# Patient Record
Sex: MALE | Employment: FULL TIME
[De-identification: names, ages, dates, MRNs, and addresses within clinical notes are randomized per-mention and may not be internally consistent; named-entity substitution may affect disease eponyms.]

---

## 2017-04-10 ENCOUNTER — RECORDS - HEALTHEAST (OUTPATIENT)
Dept: ADMINISTRATIVE | Facility: OTHER | Age: 22
End: 2017-04-10

## 2017-12-28 ENCOUNTER — RECORDS - HEALTHEAST (OUTPATIENT)
Dept: ADMINISTRATIVE | Facility: OTHER | Age: 22
End: 2017-12-28

## 2018-04-26 ENCOUNTER — OFFICE VISIT - HEALTHEAST (OUTPATIENT)
Dept: FAMILY MEDICINE | Facility: CLINIC | Age: 23
End: 2018-04-26

## 2018-04-26 DIAGNOSIS — F32.1 MODERATE MAJOR DEPRESSION (H): ICD-10-CM

## 2018-04-26 DIAGNOSIS — Z00.00 ENCOUNTER FOR ROUTINE HISTORY AND PHYSICAL EXAM FOR MALE: ICD-10-CM

## 2018-04-26 DIAGNOSIS — R41.840 ATTENTION DEFICIT: ICD-10-CM

## 2018-04-26 DIAGNOSIS — F41.1 GAD (GENERALIZED ANXIETY DISORDER): ICD-10-CM

## 2018-04-26 DIAGNOSIS — Z13.228 SCREENING FOR METABOLIC DISORDER: ICD-10-CM

## 2018-04-26 DIAGNOSIS — I10 HTN, GOAL BELOW 130/80: ICD-10-CM

## 2018-04-26 LAB
ANION GAP SERPL CALCULATED.3IONS-SCNC: 12 MMOL/L (ref 5–18)
BUN SERPL-MCNC: 14 MG/DL (ref 8–22)
CALCIUM SERPL-MCNC: 10 MG/DL (ref 8.5–10.5)
CHLORIDE BLD-SCNC: 108 MMOL/L (ref 98–107)
CHOLEST SERPL-MCNC: 191 MG/DL
CO2 SERPL-SCNC: 22 MMOL/L (ref 22–31)
CREAT SERPL-MCNC: 0.87 MG/DL (ref 0.7–1.3)
FASTING STATUS PATIENT QL REPORTED: NO
GFR SERPL CREATININE-BSD FRML MDRD: >60 ML/MIN/1.73M2
GLUCOSE BLD-MCNC: 80 MG/DL (ref 70–125)
HBA1C MFR BLD: 4.7 % (ref 3.5–6.1)
HDLC SERPL-MCNC: 46 MG/DL
LDLC SERPL CALC-MCNC: 120 MG/DL
POTASSIUM BLD-SCNC: 4 MMOL/L (ref 3.5–5)
SODIUM SERPL-SCNC: 142 MMOL/L (ref 136–145)
TRIGL SERPL-MCNC: 126 MG/DL
TSH SERPL DL<=0.005 MIU/L-ACNC: 1.02 UIU/ML (ref 0.3–5)

## 2018-04-26 ASSESSMENT — MIFFLIN-ST. JEOR: SCORE: 2249.41

## 2021-03-02 ENCOUNTER — TELEPHONE (OUTPATIENT)
Dept: BEHAVIORAL HEALTH | Facility: CLINIC | Age: 26
End: 2021-03-02

## 2021-03-02 ENCOUNTER — HOSPITAL ENCOUNTER (INPATIENT)
Facility: CLINIC | Age: 26
LOS: 3 days | Discharge: PSYCHIATRIC OR SUBSTANCE USE FACILITY | DRG: 897 | End: 2021-03-05
Attending: EMERGENCY MEDICINE | Admitting: PSYCHIATRY & NEUROLOGY
Payer: COMMERCIAL

## 2021-03-02 DIAGNOSIS — Z20.822 COVID-19 RULED OUT BY LABORATORY TESTING: ICD-10-CM

## 2021-03-02 DIAGNOSIS — F13.10 BENZODIAZEPINE ABUSE (H): ICD-10-CM

## 2021-03-02 DIAGNOSIS — F10.20 UNCOMPLICATED ALCOHOL DEPENDENCE (H): ICD-10-CM

## 2021-03-02 LAB
ALBUMIN SERPL-MCNC: 4.6 G/DL (ref 3.4–5)
ALCOHOL BREATH TEST: 0 (ref 0–0.01)
ALP SERPL-CCNC: 77 U/L (ref 40–150)
ALT SERPL W P-5'-P-CCNC: 116 U/L (ref 0–70)
AMPHETAMINES UR QL SCN: NEGATIVE
ANION GAP SERPL CALCULATED.3IONS-SCNC: 6 MMOL/L (ref 3–14)
AST SERPL W P-5'-P-CCNC: 47 U/L (ref 0–45)
BARBITURATES UR QL: NEGATIVE
BASOPHILS # BLD AUTO: 0.1 10E9/L (ref 0–0.2)
BASOPHILS NFR BLD AUTO: 0.7 %
BENZODIAZ UR QL: NEGATIVE
BILIRUB SERPL-MCNC: 0.6 MG/DL (ref 0.2–1.3)
BUN SERPL-MCNC: 13 MG/DL (ref 7–30)
CALCIUM SERPL-MCNC: 10.1 MG/DL (ref 8.5–10.1)
CANNABINOIDS UR QL SCN: POSITIVE
CHLORIDE SERPL-SCNC: 108 MMOL/L (ref 94–109)
CO2 SERPL-SCNC: 25 MMOL/L (ref 20–32)
COCAINE UR QL: NEGATIVE
CREAT SERPL-MCNC: 0.87 MG/DL (ref 0.66–1.25)
DIFFERENTIAL METHOD BLD: NORMAL
EOSINOPHIL # BLD AUTO: 0.1 10E9/L (ref 0–0.7)
EOSINOPHIL NFR BLD AUTO: 1.4 %
ERYTHROCYTE [DISTWIDTH] IN BLOOD BY AUTOMATED COUNT: 11.7 % (ref 10–15)
ETHANOL UR QL SCN: NEGATIVE
GFR SERPL CREATININE-BSD FRML MDRD: >90 ML/MIN/{1.73_M2}
GGT SERPL-CCNC: 178 U/L (ref 0–75)
GLUCOSE SERPL-MCNC: 108 MG/DL (ref 70–99)
HCT VFR BLD AUTO: 50.5 % (ref 40–53)
HGB BLD-MCNC: 17.5 G/DL (ref 13.3–17.7)
IMM GRANULOCYTES # BLD: 0.1 10E9/L (ref 0–0.4)
IMM GRANULOCYTES NFR BLD: 0.8 %
LABORATORY COMMENT REPORT: NORMAL
LYMPHOCYTES # BLD AUTO: 2.1 10E9/L (ref 0.8–5.3)
LYMPHOCYTES NFR BLD AUTO: 24.9 %
MCH RBC QN AUTO: 29.7 PG (ref 26.5–33)
MCHC RBC AUTO-ENTMCNC: 34.7 G/DL (ref 31.5–36.5)
MCV RBC AUTO: 86 FL (ref 78–100)
MONOCYTES # BLD AUTO: 0.6 10E9/L (ref 0–1.3)
MONOCYTES NFR BLD AUTO: 7 %
NEUTROPHILS # BLD AUTO: 5.5 10E9/L (ref 1.6–8.3)
NEUTROPHILS NFR BLD AUTO: 65.2 %
NRBC # BLD AUTO: 0 10*3/UL
NRBC BLD AUTO-RTO: 0 /100
OPIATES UR QL SCN: NEGATIVE
PLATELET # BLD AUTO: 282 10E9/L (ref 150–450)
POTASSIUM SERPL-SCNC: 4.5 MMOL/L (ref 3.4–5.3)
PROT SERPL-MCNC: 8.1 G/DL (ref 6.8–8.8)
RBC # BLD AUTO: 5.89 10E12/L (ref 4.4–5.9)
SARS-COV-2 RNA RESP QL NAA+PROBE: NEGATIVE
SODIUM SERPL-SCNC: 139 MMOL/L (ref 133–144)
SPECIMEN SOURCE: NORMAL
WBC # BLD AUTO: 8.5 10E9/L (ref 4–11)

## 2021-03-02 PROCEDURE — 99207 PR CDG-HISTORY COMP: MEETS EXP. PROB FOCUSED- DOWN CODED LACK OF PFSH: CPT | Performed by: PSYCHIATRY & NEUROLOGY

## 2021-03-02 PROCEDURE — 128N000004 HC R&B CD ADULT

## 2021-03-02 PROCEDURE — HZ2ZZZZ DETOXIFICATION SERVICES FOR SUBSTANCE ABUSE TREATMENT: ICD-10-PCS | Performed by: EMERGENCY MEDICINE

## 2021-03-02 PROCEDURE — 82977 ASSAY OF GGT: CPT | Performed by: EMERGENCY MEDICINE

## 2021-03-02 PROCEDURE — 82075 ASSAY OF BREATH ETHANOL: CPT | Performed by: EMERGENCY MEDICINE

## 2021-03-02 PROCEDURE — 99284 EMERGENCY DEPT VISIT MOD MDM: CPT | Performed by: EMERGENCY MEDICINE

## 2021-03-02 PROCEDURE — 80307 DRUG TEST PRSMV CHEM ANLYZR: CPT | Performed by: EMERGENCY MEDICINE

## 2021-03-02 PROCEDURE — C9803 HOPD COVID-19 SPEC COLLECT: HCPCS | Performed by: EMERGENCY MEDICINE

## 2021-03-02 PROCEDURE — 99285 EMERGENCY DEPT VISIT HI MDM: CPT | Mod: 25 | Performed by: EMERGENCY MEDICINE

## 2021-03-02 PROCEDURE — 80320 DRUG SCREEN QUANTALCOHOLS: CPT | Performed by: EMERGENCY MEDICINE

## 2021-03-02 PROCEDURE — 99207 PR CONSULT E&M CHANGED TO INITIAL LEVEL: CPT | Performed by: PHYSICIAN ASSISTANT

## 2021-03-02 PROCEDURE — 80053 COMPREHEN METABOLIC PANEL: CPT | Performed by: EMERGENCY MEDICINE

## 2021-03-02 PROCEDURE — 87635 SARS-COV-2 COVID-19 AMP PRB: CPT | Performed by: EMERGENCY MEDICINE

## 2021-03-02 PROCEDURE — 85025 COMPLETE CBC W/AUTO DIFF WBC: CPT | Performed by: EMERGENCY MEDICINE

## 2021-03-02 PROCEDURE — 250N000013 HC RX MED GY IP 250 OP 250 PS 637: Performed by: PSYCHIATRY & NEUROLOGY

## 2021-03-02 PROCEDURE — 99222 1ST HOSP IP/OBS MODERATE 55: CPT | Performed by: PHYSICIAN ASSISTANT

## 2021-03-02 PROCEDURE — 99221 1ST HOSP IP/OBS SF/LOW 40: CPT | Mod: AI | Performed by: PSYCHIATRY & NEUROLOGY

## 2021-03-02 RX ORDER — LISINOPRIL 30 MG/1
30 TABLET ORAL DAILY
Status: ON HOLD | COMMUNITY
End: 2021-03-04

## 2021-03-02 RX ORDER — FAMOTIDINE 20 MG
1000 TABLET ORAL WEEKLY
Status: ON HOLD | COMMUNITY
End: 2021-03-02

## 2021-03-02 RX ORDER — METFORMIN HCL 500 MG
500 TABLET, EXTENDED RELEASE 24 HR ORAL
Status: DISCONTINUED | OUTPATIENT
Start: 2021-03-03 | End: 2021-03-05 | Stop reason: HOSPADM

## 2021-03-02 RX ORDER — ATORVASTATIN CALCIUM 10 MG/1
10 TABLET, FILM COATED ORAL DAILY
Status: DISCONTINUED | OUTPATIENT
Start: 2021-03-02 | End: 2021-03-05 | Stop reason: HOSPADM

## 2021-03-02 RX ORDER — ERGOCALCIFEROL 1.25 MG/1
50000 CAPSULE, LIQUID FILLED ORAL WEEKLY
Status: ON HOLD | COMMUNITY
End: 2021-03-04

## 2021-03-02 RX ORDER — ATORVASTATIN CALCIUM 10 MG/1
10 TABLET, FILM COATED ORAL DAILY
Status: DISCONTINUED | OUTPATIENT
Start: 2021-03-02 | End: 2021-03-02

## 2021-03-02 RX ORDER — DIAZEPAM 5 MG
5-20 TABLET ORAL EVERY 30 MIN PRN
Status: DISCONTINUED | OUTPATIENT
Start: 2021-03-02 | End: 2021-03-04

## 2021-03-02 RX ORDER — LANOLIN ALCOHOL/MO/W.PET/CERES
100 CREAM (GRAM) TOPICAL DAILY
Status: DISCONTINUED | OUTPATIENT
Start: 2021-03-02 | End: 2021-03-02

## 2021-03-02 RX ORDER — ERGOCALCIFEROL 1.25 MG/1
50000 CAPSULE, LIQUID FILLED ORAL WEEKLY
Status: DISCONTINUED | OUTPATIENT
Start: 2021-03-05 | End: 2021-03-05 | Stop reason: HOSPADM

## 2021-03-02 RX ORDER — FOLIC ACID 1 MG/1
1 TABLET ORAL DAILY
Status: DISCONTINUED | OUTPATIENT
Start: 2021-03-02 | End: 2021-03-02

## 2021-03-02 RX ORDER — FOLIC ACID 1 MG/1
1 TABLET ORAL DAILY
Status: DISCONTINUED | OUTPATIENT
Start: 2021-03-02 | End: 2021-03-05 | Stop reason: HOSPADM

## 2021-03-02 RX ORDER — FLUOXETINE 40 MG/1
40 CAPSULE ORAL DAILY
Status: ON HOLD | COMMUNITY
End: 2021-03-04

## 2021-03-02 RX ORDER — ATORVASTATIN CALCIUM 10 MG/1
10 TABLET, FILM COATED ORAL DAILY
Status: ON HOLD | COMMUNITY
End: 2021-03-04

## 2021-03-02 RX ORDER — MULTIPLE VITAMINS W/ MINERALS TAB 9MG-400MCG
1 TAB ORAL DAILY
Status: DISCONTINUED | OUTPATIENT
Start: 2021-03-02 | End: 2021-03-05 | Stop reason: HOSPADM

## 2021-03-02 RX ORDER — PHENOBARBITAL 32.4 MG/1
32.4 TABLET ORAL 3 TIMES DAILY
Status: COMPLETED | OUTPATIENT
Start: 2021-03-02 | End: 2021-03-04

## 2021-03-02 RX ORDER — MULTIPLE VITAMINS W/ MINERALS TAB 9MG-400MCG
1 TAB ORAL DAILY
Status: DISCONTINUED | OUTPATIENT
Start: 2021-03-02 | End: 2021-03-02

## 2021-03-02 RX ORDER — LANOLIN ALCOHOL/MO/W.PET/CERES
100 CREAM (GRAM) TOPICAL DAILY
Status: DISCONTINUED | OUTPATIENT
Start: 2021-03-02 | End: 2021-03-05 | Stop reason: HOSPADM

## 2021-03-02 RX ORDER — VITAMIN B COMPLEX
1000 TABLET ORAL WEEKLY
Status: DISCONTINUED | OUTPATIENT
Start: 2021-03-02 | End: 2021-03-02

## 2021-03-02 RX ORDER — METFORMIN HCL 500 MG
500 TABLET, EXTENDED RELEASE 24 HR ORAL DAILY
Status: ON HOLD | COMMUNITY
End: 2021-03-04

## 2021-03-02 RX ORDER — PHENOBARBITAL 32.4 MG/1
32.4 TABLET ORAL 3 TIMES DAILY
Status: DISCONTINUED | OUTPATIENT
Start: 2021-03-02 | End: 2021-03-02

## 2021-03-02 RX ADMIN — ATORVASTATIN CALCIUM 10 MG: 10 TABLET, FILM COATED ORAL at 16:50

## 2021-03-02 RX ADMIN — DIAZEPAM 5 MG: 5 TABLET ORAL at 20:12

## 2021-03-02 RX ADMIN — FOLIC ACID 1 MG: 1 TABLET ORAL at 16:49

## 2021-03-02 RX ADMIN — THIAMINE HCL TAB 100 MG 100 MG: 100 TAB at 16:49

## 2021-03-02 RX ADMIN — MULTIPLE VITAMINS W/ MINERALS TAB 1 TABLET: TAB at 16:49

## 2021-03-02 RX ADMIN — FLUOXETINE 40 MG: 20 CAPSULE ORAL at 16:47

## 2021-03-02 RX ADMIN — PHENOBARBITAL 32.4 MG: 32.4 TABLET ORAL at 20:12

## 2021-03-02 RX ADMIN — LISINOPRIL 30 MG: 20 TABLET ORAL at 16:47

## 2021-03-02 RX ADMIN — DIAZEPAM 5 MG: 5 TABLET ORAL at 16:50

## 2021-03-02 RX ADMIN — PHENOBARBITAL 32.4 MG: 32.4 TABLET ORAL at 16:48

## 2021-03-02 ASSESSMENT — ACTIVITIES OF DAILY LIVING (ADL)
ORAL_HYGIENE: INDEPENDENT
LAUNDRY: WITH SUPERVISION
HYGIENE/GROOMING: INDEPENDENT
DRESS: INDEPENDENT;STREET CLOTHES;SCRUBS (BEHAVIORAL HEALTH)

## 2021-03-02 ASSESSMENT — ENCOUNTER SYMPTOMS
FEVER: 0
HEADACHES: 0
SHORTNESS OF BREATH: 0
NAUSEA: 0
NECK STIFFNESS: 0
EYE REDNESS: 0
CONFUSION: 0
ABDOMINAL PAIN: 0
COLOR CHANGE: 0
VOMITING: 0
DIFFICULTY URINATING: 0
ARTHRALGIAS: 0
MYALGIAS: 0

## 2021-03-02 ASSESSMENT — MIFFLIN-ST. JEOR
SCORE: 2458.64
SCORE: 2457.73

## 2021-03-02 NOTE — TELEPHONE ENCOUNTER
Pt presents in fv ed seeking detox  B: pt abusing 4-6mg Xanax daily past 3 years, states he cannot stop or has withdrawals of shakes, anxiety and irritability, foggy thinking. Also using up to 10 drinks daily of etoh. Last use of both was last night. Pt deies any hx seizures or dt's. Denies any MHsymptoms.   A: etoh and benzo detox.  Cooperative, vol.  R: [facility]/3A Cd  Patient cleared and ready for behavioral bed placement: Yes

## 2021-03-02 NOTE — PHARMACY-ADMISSION MEDICATION HISTORY
Admission Medication History Completed by Pharmacy    See [facility] Admission Navigator for allergy information, preferred outpatient pharmacy, prior to admission medications and immunization status.     Medication History Sources:     Patient    Pharmacy fill history via [employer]    Changes made to PTA medication list (reason):    Added: None    Deleted: None    Changed:   o Metformin to extended release (per fill history)  o Vitamin D (cholecalciferol) 1000 units po once weekly --> ergocalciferol 50,000 units po once weekly (per fill history, pt)    Additional Information:    Pt takes vitamin D weekly on Fridays.    Prior to Admission medications    Medication Sig Last Dose Taking? Auth Provider   atorvastatin (LIPITOR) 10 MG tablet Take 10 mg by mouth daily 3/1/2021 at Unknown time Yes Reported, Patient   FLUoxetine (PROZAC) 40 MG capsule Take 40 mg by mouth daily 3/1/2021 at Unknown time Yes Reported, Patient   lisinopril (ZESTRIL) 30 MG tablet Take 30 mg by mouth daily 3/1/2021 at Unknown time Yes Reported, Patient   metFORMIN (GLUCOPHAGE-XR) 500 MG 24 hr tablet Take 500 mg by mouth daily 3/1/2021 at Unknown time Yes Unknown, Entered By History   vitamin D2 (ERGOCALCIFEROL) 51669 units (1250 mcg) capsule Take 50,000 Units by mouth once a week 2/26/2021 Yes Unknown, Entered By History       Date completed: 03/02/21    Medication history completed by: Brenda Bueno [facility]
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
## 2021-03-02 NOTE — PROGRESS NOTES
03/02/21 1430   Patient Belongings   Did you bring any home meds/supplements to the hospital?  No   Patient Belongings other (see comments)   Belongings Search Yes   Clothing Search Yes   Second Staff Johnny LAWSON, Ras BUSTAMANTE,     Pants, coat, shoes, small bag in storage  Cell in med room  Vape, ear buds, keys in sharps  2 MC, medical card, passport in security    A               Admission:  I am responsible for any personal items that are not sent to the safe or pharmacy.  [facility] is not responsible for loss, theft or damage of any property in my possession.    Signature:  _________________________________ Date: _______  Time: _____                                              Staff Signature:  ____________________________ Date: ________  Time: _____      2nd Staff person, if patient is unable/unwilling to sign:    Signature: ________________________________ Date: ________  Time: _____     Discharge:  [facility] has returned all of my personal belongings:    Signature: _________________________________ Date: ________  Time: _____                                          Staff Signature:  ____________________________ Date: ________  Time: _____

## 2021-03-02 NOTE — PLAN OF CARE
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  He averages 4-6 mg of Xanax per day and approximately 7-10 drinks per day, both with most recent use at 01:00 on 3/2/2021.  He struggles with morbid obesity, and has been prescribed oral glucophage as an appetite suppressant.  He reports no other acute medical concerns.  He is interested in treatment post-discharge, and reports a supportive network of social resources at his disposal.  He has never been to \"detox\" nor treatment before.  He has no children.  Will monitor as prudent.  "

## 2021-03-02 NOTE — ED NOTES
Patient here to detox from ETOH, cocaine, xanax and THC. Patient states he binge drinks for years. Patient has a drink or 2 per day during the week, and xanax 4-6 mg per day. Last xanax use last night. ETOH last use last night. Patient has not been to treatment or detox in the past. Denies hx of seizure or DTs.

## 2021-03-02 NOTE — CONSULTS
[facility]  Consult Note - Hospitalist Service     Date of Admission:  3/2/2021  Consult Requested by: Dr. Peguero   Reason for Consult: Co management     Assessment & Plan   Portillo Queen is a 25 year old male admitted on 3/2/2021. He has a history of depression, SHERIN, ADHD, polysubstance abuse who is admitted to  for detox from alcohol and xanax.     Polysubstance abuse   Reports use of ETOH, cocaine, xanax and THC. Reports he drinks about ten drinks at least two days per week. Uses Xanax 4-6 mg per day. Denies hx of withdrawal seizures or DTs. Utox positive for cannabinoids, negative for benzodiazepine, ETOH, cocaine.   - Management per psychiatry     ADHD   Depression   Anxiety   - Management per primary team     [facility] hypertension  PTA on lisinopril 30 mg daily. BP slightly elevated in the setting of withdrawal.   - Continue PTA lisinopril     Transaminitis   , AST 47. Tbili and alk phos wnl. GGT elevated at 178. Elevated in liver enzymes likely 2/2 ETOH use however not consistent with typical 2:1 ratio seen in alcohol hepatitis. Denies abdominal pain, nausea, vomiting.   - Repeat CMP in 48 hours     Obesity   BMI 36   PTA on metformin 500 mg daily. Cr stable.   - Continue PTA metformin   - Continue PTA Lipitor        The patient's care was discussed with the Bedside Nurse and Patient.    DIANNA Marcelo  [facility]  Contact information available via [facility] Paging/Directory    ______________________________________________________________________    Chief Complaint   Substance abuse     History is obtained from the patient and review of medical record.     History of Present Illness   Portillo Queen is a 25 year old male who has a history of depression, SHERIN, ADHD, obesity, polysubstance abuse who is admitted to  for detox from alcohol and xanax. Patient reports using xanax daily about 4-6 mg. He also  "uses opioids sporadically. He \"binge\" drinking at last two nights per week when he typically consumes at least ten alcohol drinks. Patient also reports using TCH daily. Denies withdrawal seizures or DTs. Current reports withdrawal symptoms of anxiety. Patient reports physically he has been feeling well. He has been working with his PCP on weight management and is currently taking Metformin for cravings. Denies cough, SOB, chest pain, nausea, vomiting, abdominal pain, bowel or bladder issues, numbness, tingling, rash. Denies IV drug use.       Review of Systems   The 10 point Review of Systems is negative other than noted in the HPI.    Past Medical History    I have reviewed this patient's medical history and updated it with pertinent information if needed.   Past Medical History:   Diagnosis Date     High cholesterol      Hypertension        Past Surgical History   I have reviewed this patient's surgical history and updated it with pertinent information if needed.  History reviewed. No pertinent surgical history.    Social History   I have reviewed this patient's social history and updated it with pertinent information if needed.  Social History     Tobacco Use     Smoking status: Never Smoker     Smokeless tobacco: Never Used   Substance Use Topics     Alcohol use: Yes     Comment: 5 shots gin and tonic or [place].     Drug use: Yes     Types: Benzodiazepines, Cocaine, Opiates, Marijuana     Comment: Xanax 2 bars per day.  THC uses wax 5 times per day ( states it is stronger)       Family History   I have reviewed this patient's family history and updated it with pertinent information if needed.  Family History   Problem Relation Age of Onset     Diabetes Mother      Diabetes Father      Diabetes Maternal Grandfather      Diabetes Paternal Grandmother        Medications   Medications Prior to Admission   Medication Sig Dispense Refill Last Dose     atorvastatin (LIPITOR) 10 MG tablet Take 10 mg by mouth daily  "  3/1/2021 at Unknown time     FLUoxetine (PROZAC) 40 MG capsule Take 40 mg by mouth daily   3/1/2021 at Unknown time     lisinopril (ZESTRIL) 30 MG tablet Take 30 mg by mouth daily   3/1/2021 at Unknown time     metFORMIN (GLUCOPHAGE) 500 MG tablet Take 500 mg by mouth every morning Takes with meal   3/1/2021 at Unknown time     Vitamin D, Cholecalciferol, 25 MCG (1000 UT) CAPS Take 1,000 Units by mouth once a week   Past Week at Unknown time       Allergies   Allergies   Allergen Reactions     Amoxicillin Rash     Penicillins Rash     Sulfa Drugs Rash       Physical Exam   Vital Signs: Temp: 97.9  F (36.6  C) Temp src: Oral BP: (!) 140/88 Pulse: 88   Resp: 16 SpO2: 98 % O2 Device: None (Room air)    Weight: 306 lbs 0 oz  GENERAL: Alert and oriented x 3. NAD.   HEENT: Anicteric sclera. PERRL. Mucous membranes moist and without lesions.   CV: RRR. S1, S2. No murmurs appreciated.   RESPIRATORY: Effort normal on RA. Lungs CTAB with no wheezing, rales, rhonchi.   GI: Abdomen soft, obese and non distended, bowel sounds present. No tenderness, rebound, guarding.   MUSCULOSKELETAL: No joint swelling or tenderness. Moves all extremities.   NEUROLOGICAL: No focal deficits.   EXTREMITIES: No peripheral edema. Intact bilateral pedal pulses.   SKIN: No jaundice. No rashes.       Data   Results for orders placed or performed during the hospital encounter of 03/02/21 (from the past 24 hour(s))   Drug abuse screen 6 urine (tox)   Result Value Ref Range    Amphetamine Qual Urine Negative NEG^Negative    Barbiturates Qual Urine Negative NEG^Negative    Benzodiazepine Qual Urine Negative NEG^Negative    Cannabinoids Qual Urine Positive (A) NEG^Negative    Cocaine Qual Urine Negative NEG^Negative    Ethanol Qual Urine Negative NEG^Negative    Opiates Qualitative Urine Negative NEG^Negative   CBC with platelets differential   Result Value Ref Range    WBC 8.5 4.0 - 11.0 10e9/L    RBC Count 5.89 4.4 - 5.9 10e12/L    Hemoglobin 17.5 13.3  - 17.7 g/dL    Hematocrit 50.5 40.0 - 53.0 %    MCV 86 78 - 100 fl    MCH 29.7 26.5 - 33.0 pg    MCHC 34.7 31.5 - 36.5 g/dL    RDW 11.7 10.0 - 15.0 %    Platelet Count 282 150 - 450 10e9/L    Diff Method Automated Method     % Neutrophils 65.2 %    % Lymphocytes 24.9 %    % Monocytes 7.0 %    % Eosinophils 1.4 %    % Basophils 0.7 %    % Immature Granulocytes 0.8 %    Nucleated RBCs 0 0 /100    Absolute Neutrophil 5.5 1.6 - 8.3 10e9/L    Absolute Lymphocytes 2.1 0.8 - 5.3 10e9/L    Absolute Monocytes 0.6 0.0 - 1.3 10e9/L    Absolute Eosinophils 0.1 0.0 - 0.7 10e9/L    Absolute Basophils 0.1 0.0 - 0.2 10e9/L    Abs Immature Granulocytes 0.1 0 - 0.4 10e9/L    Absolute Nucleated RBC 0.0    Comprehensive metabolic panel   Result Value Ref Range    Sodium 139 133 - 144 mmol/L    Potassium 4.5 3.4 - 5.3 mmol/L    Chloride 108 94 - 109 mmol/L    Carbon Dioxide 25 20 - 32 mmol/L    Anion Gap 6 3 - 14 mmol/L    Glucose 108 (H) 70 - 99 mg/dL    Urea Nitrogen 13 7 - 30 mg/dL    Creatinine 0.87 0.66 - 1.25 mg/dL    GFR Estimate >90 >60 mL/min/[1.73_m2]    GFR Estimate If Black >90 >60 mL/min/[1.73_m2]    Calcium 10.1 8.5 - 10.1 mg/dL    Bilirubin Total 0.6 0.2 - 1.3 mg/dL    Albumin 4.6 3.4 - 5.0 g/dL    Protein Total 8.1 6.8 - 8.8 g/dL    Alkaline Phosphatase 77 40 - 150 U/L     (H) 0 - 70 U/L    AST 47 (H) 0 - 45 U/L   Asymptomatic SARS-CoV-2 COVID-19 Virus (Coronavirus) by PCR    Specimen: Nasopharyngeal   Result Value Ref Range    SARS-CoV-2 Virus Specimen Source Nasopharyngeal     SARS-CoV-2 PCR Result NEGATIVE     SARS-CoV-2 PCR Comment (Note)    GGT   Result Value Ref Range     (H) 0 - 75 U/L   Alcohol breath test POCT   Result Value Ref Range    Alcohol Breath Test 0 0.00 - 0.01
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
## 2021-03-03 PROCEDURE — 99233 SBSQ HOSP IP/OBS HIGH 50: CPT | Performed by: PSYCHIATRY & NEUROLOGY

## 2021-03-03 PROCEDURE — 250N000013 HC RX MED GY IP 250 OP 250 PS 637: Performed by: PSYCHIATRY & NEUROLOGY

## 2021-03-03 PROCEDURE — 128N000004 HC R&B CD ADULT

## 2021-03-03 PROCEDURE — 99207 PR CDG-MDM COMPONENT: MEETS HIGH - UP CODED: CPT | Performed by: PSYCHIATRY & NEUROLOGY

## 2021-03-03 RX ORDER — GABAPENTIN 300 MG/1
300 CAPSULE ORAL 3 TIMES DAILY PRN
Status: DISCONTINUED | OUTPATIENT
Start: 2021-03-03 | End: 2021-03-05 | Stop reason: HOSPADM

## 2021-03-03 RX ORDER — TRAZODONE HYDROCHLORIDE 50 MG/1
50 TABLET, FILM COATED ORAL
Status: DISCONTINUED | OUTPATIENT
Start: 2021-03-03 | End: 2021-03-05 | Stop reason: HOSPADM

## 2021-03-03 RX ADMIN — FOLIC ACID 1 MG: 1 TABLET ORAL at 08:28

## 2021-03-03 RX ADMIN — DIAZEPAM 10 MG: 5 TABLET ORAL at 04:29

## 2021-03-03 RX ADMIN — MULTIPLE VITAMINS W/ MINERALS TAB 1 TABLET: TAB at 08:28

## 2021-03-03 RX ADMIN — PHENOBARBITAL 32.4 MG: 32.4 TABLET ORAL at 13:55

## 2021-03-03 RX ADMIN — PHENOBARBITAL 32.4 MG: 32.4 TABLET ORAL at 08:29

## 2021-03-03 RX ADMIN — GABAPENTIN 300 MG: 300 CAPSULE ORAL at 13:55

## 2021-03-03 RX ADMIN — PHENOBARBITAL 32.4 MG: 32.4 TABLET ORAL at 20:22

## 2021-03-03 RX ADMIN — THIAMINE HCL TAB 100 MG 100 MG: 100 TAB at 08:28

## 2021-03-03 RX ADMIN — FLUOXETINE 40 MG: 20 CAPSULE ORAL at 08:28

## 2021-03-03 RX ADMIN — METFORMIN HYDROCHLORIDE 500 MG: 500 TABLET, EXTENDED RELEASE ORAL at 08:28

## 2021-03-03 RX ADMIN — DIAZEPAM 10 MG: 5 TABLET ORAL at 08:30

## 2021-03-03 RX ADMIN — LISINOPRIL 30 MG: 20 TABLET ORAL at 08:28

## 2021-03-03 RX ADMIN — ATORVASTATIN CALCIUM 10 MG: 10 TABLET, FILM COATED ORAL at 08:28

## 2021-03-03 ASSESSMENT — ACTIVITIES OF DAILY LIVING (ADL)
HYGIENE/GROOMING: INDEPENDENT
DRESS: STREET CLOTHES;INDEPENDENT
ORAL_HYGIENE: INDEPENDENT
LAUNDRY: WITH SUPERVISION

## 2021-03-03 NOTE — PLAN OF CARE
"S:  Mario has been visible in the milieu.  He participated in music group and then later on stayed in the lounge for half of the Recovery video.  He is eating and drinking normally.  He denies any thoughts of self harm, suicide or thoughts of harming others. He rates his depression at a \"7\" and his anxiety at an \"8\".  B: Pt admitted for alcohol and benzodiazepine (BZ) withdrawal and detoxification.  A:  Pt in moderate alcohol and BZ withdrawal AEB MSSA scores of 8 & 8.  R:  Administered regularly scheduled medications (see eMar) and PRN diazepam 5 mg. Continue to monitor and medicate as ordered and indicated.   "

## 2021-03-03 NOTE — PROGRESS NOTES
"[facility], [place]   Psychiatric Progress Note        Interim history   This is a 25 year old male with 1.  Alcohol use disorder, severe.   2.  Alcohol withdrawal, severe.     3.  Xanax use disorder.     4.  Xanax withdrawal, severe.   5.  Marijuana use disorder, severe.   6.  Major depressive disorder, recurrent, severe without psychosis.   7.  Posttraumatic stress disorder, chronic. .Pt seen in rounds.   The patient's care was discussed with the treatment team during the daily team meeting and/or staff's chart notes were reviewed.  Staff report patient has been visible in the milieu,  no acute eventsovernight.     Patient's mood is positive   Energy Level:MODERATE  Sleep:poor   Appetite:fine, low  motivation interest   Suicidal/homicidal ideation/plan intent.psychosis  No prior suicde attempts  No access to gun  Pt is in alcohol withdrawal still being monitered every 4 hrs for it,   Pt mssa score are monitered  Tolerating meds and has no side effects.              Medications:     Current Facility-Administered Medications   Medication     atorvastatin (LIPITOR) tablet 10 mg     diazepam (VALIUM) tablet 5-20 mg     FLUoxetine (PROzac) capsule 40 mg     folic acid (FOLVITE) tablet 1 mg     lisinopril (ZESTRIL) tablet 30 mg     metFORMIN (GLUCOPHAGE-XR) 24 hr tablet 500 mg     multivitamin w/minerals (THERA-VIT-M) tablet 1 tablet     PHENobarbital (LUMINAL) tablet 32.4 mg     thiamine (B-1) tablet 100 mg     [START ON 3/5/2021] vitamin D2 (ERGOCALCIFEROL) 92233 units (1250 mcg) capsule 50,000 Units             Allergies:     Allergies   Allergen Reactions     Amoxicillin Rash     Penicillins Rash     Sulfa Drugs Rash            Psychiatric Examination:   Blood pressure (!) 131/91, pulse 85, temperature 97.4  F (36.3  C), temperature source Temporal, resp. rate 16, height 1.905 m (6' 3\"), weight 138.8 kg (306 lb), SpO2 96 %.  Weight is 306 lbs 0 oz  Body mass index is 38.25 " kg/m .    Appearance:  awake, alert and adequately groomed  Attitude:  cooperative  Eye Contact:  good  Mood:  better  Affect:  appropriate and in normal range and mood congruent  Speech:  clear, coherent rate /rhythm are good  Psychomotor Behavior:  no evidence of tardive dyskinesia, dystonia, or tics and intact station, gait and muscle tone  Throught Process:  logical  Associations:  no loose associations  Thought Content:  no evidence of suicidal ideation or homicidal ideation, no evidence of psychotic thought, no auditory hallucinations present and no visual hallucinations present  Insight:  fair  Judgement:  intact  Oriented to:  time, person, and place  Attention Span and Concentration:  intact  Recent and Remote Memory:  intact  Language fund of knowledge are adequate         Labs:     No results found for: NTBNPI, NTBNP  Lab Results   Component Value Date    WBC 8.5 03/02/2021    HGB 17.5 03/02/2021    HCT 50.5 03/02/2021    MCV 86 03/02/2021     03/02/2021     No results found for: TSH      DX 1.  Alcohol use disorder, severe.   2.  Alcohol withdrawal, severe.     3.  Xanax use disorder.     4.  Xanax withdrawal, severe.   5.  Marijuana use disorder, severe.   6.  Major depressive disorder, recurrent, severe without psychosis.   7.  Posttraumatic stress disorder, chronic.      PLAN   Alcohol intoxication/withdrawal, presently is on MSSA protocol with Valium. Continue the same MSSA protocol as ordered. Continue thiamine 100 mg p.o. daily, M.V.I. one p.o. daily and folate 1 mg p.o. Daily  Will continue mssa protocal to detox off alcohol on valium,  Pt is c/o of termor , agitation poor sleep and poor appetite, he has sweats, feels shakey  On mssa client scored 8 today and  needed needed  0mg po as of yet , total dose since admission was 30    MSSA    Eating Disturbances: ate and enjoyed all of it or not applicable  Tremor: 1 - not visibly apparent but can be felt by the examiner placing his fingertip  slightly against the patient's fingertips  Sleep Disturbance: slept through the night or not applicable  Clouding of Sensorium: no evidence  Hallucinations: 0 - none  Quality of Contact: 0 - awareness of examiner and people around him/her  Agitation: 2  Paroxysmal Sweats: 2  Temperature: 99.5 or below  Pulse: 2 - 80 to 89  Total MSSA Score: 8    Continue to detox from benzos using phenobarbital  Patient is on 30 mg 3 times a day his U tox is negative for benzos    We will continue Prozac for his PTSD    CMP ordered for tomorrow to monitor liver enzymes trending down  Laboratory/Imaging: reviewed with patient   Consults: internal medicine consult reviewed  Patient will be treated in therapeutic milieu with appropriate individual and group therapies as described.  PDMP CHECKED     Supportive psychotheraoy provided, mian talked about recovery enviroment, relapse prevention, triggers to use.  Discussed with patient many issues of addiction,triggers, relapse, and establishing a solid recovery program.  Asked pt to be med complinat   Medical diagnoses to be addressed this admission:    Plan:  Assessment & Plan     Portillo Queen is a 25 year old male admitted on 3/2/2021. He has a history of depression, SHERIN, ADHD, polysubstance abuse who is admitted to  for detox from alcohol and xanax.      Polysubstance abuse   Reports use of ETOH, cocaine, xanax and THC. Reports he drinks about ten drinks at least two days per week. Uses Xanax 4-6 mg per day. Denies hx of withdrawal seizures or DTs. Utox positive for cannabinoids, negative for benzodiazepine, ETOH, cocaine.   - Management per psychiatry      ADHD   Depression   Anxiety   - Management per primary team      [facility] hypertension  PTA on lisinopril 30 mg daily. BP slightly elevated in the setting of withdrawal.   - Continue PTA lisinopril      Transaminitis   , AST 47. Tbili and alk phos wnl. GGT elevated at 178. Elevated in liver enzymes likely 2/2 ETOH use  however not consistent with typical 2:1 ratio seen in alcohol hepatitis. Denies abdominal pain, nausea, vomiting.   - Repeat CMP in 48 hours      Obesity   BMI 36   PTA on metformin 500 mg daily. Cr stable.   - Continue PTA metformin   - Continue PTA Lipitor            The patient's care was discussed with the Bedside Nurse and Patient.       Legal Status: voluntary    Safety Assessment:   Checks:  15 min  Precautions: withdrawal precautions  Pt has not required locked seclusion or restraints in the past 24 hours to maintain safety, please refer to RN documentation for further details.  Discussed with patient many issues of addiction,triggers, relapse, and establishing a solid recovery program.  Able to give informed consent:  YES   Discussed Risks/Benefits/Side Effects/Alternatives: YES    After discussion of the indications, risks, benefits, alternatives and consequences of no treatment, the patient elects to complete detox and do treatment

## 2021-03-03 NOTE — PLAN OF CARE
Behavioral Team Discussion: (3/3/2021)    Continued Stay Criteria/Rationale: Patient admitted for alcohol and benzodiazepine withdrawal and Use Disorder.  Plan: The following services will be provided to the patient; psychiatric assessment, medication management, therapeutic milieu, individual and group support, and skills groups.   Participants: 3A Provider: Dr. Shahida Peguero MD; 3A RN: Dain Paulino RN; 3A CM's: Ashlee Dickey and Anneliese Joseph.  Summary/Recommendation: Providers will assess today for treatment recommendations, discharge planning, and aftercare plans. CM will meet with pt for discharge planning.   Medical/Physical: morbid obesity  Precautions:   Behavioral Orders   Procedures     Code 1 - Restrict to Unit     Fall precautions     Routine Programming     As clinically indicated     Seizure precautions     Status 15     Every 15 minutes.     Withdrawal precautions     Rationale for change in precautions or plan: N/A  Progress: No Change.    PT SEEN   AGREE WITH ASSESSMENT AND PLAN

## 2021-03-03 NOTE — PLAN OF CARE
"  Problem: Substance Withdrawal  Goal: Substance Withdrawal  Description: Signs and symptoms of listed problems will be absent or manageable.  Outcome: No Change     Patient was visible in the beginning of the shift, then he became isolative to his room. Flat and blunted affect. He endorsed anxiety 9/10 and depression 7/10. Denied SI/SIB. Gabapentin 300mg TID PRN was ordered and administered for anxiety.    MSSA=8 and 7;10mg of valium was administered for the score of 8.    Patient continues on scheduled phenobarb 32.4mg TID; patient is tolerating it well.    During room search, patient's personal cell phone was found in his room; phone was returned to patient's belongings. Patient reported that he walked away with the phone after requesting to get numbers from the phone. He understands that personal phones should not be used on the unit due to privacy issues.    Also, patient has become \"kierra-kierra\" with another male peer; this peer went to his room this morning. Xanax was later found in the peer's room during room search. Patient was questioned as to whether his peer gave him xanax and he declined. \"I didn't even know he had xanax.\"    We'll continue to monitor.  "

## 2021-03-03 NOTE — PROGRESS NOTES
Writer met with pt to discuss aftercare plans. Pt states he would like treatment in [facility]. Pt has private [facility] policy and writer informed him there will be out-of-pocket cost for room and board along with any remaining deductible. Pt states he will call his parents to verify this is ok. Pt was instructed to complete paperwork for CD assessment and referral and turn into RN when complete. In-basket sent to financial counseling to obtain out-of-pocket cost for LP+ admission.  CM continues    Update: Pt now reports he is interested in treatment at [facility]. Will follow-up with assessment and referral.

## 2021-03-03 NOTE — PROGRESS NOTES
MSSA scores of 5/9, p receives 10mg of valium this shift. Pt is observed to sleep through out the noc.

## 2021-03-03 NOTE — PROGRESS NOTES
"CLINICAL NUTRITION SERVICES - BRIEF NOTE     Nutrition Prescription    RECOMMENDATIONS FOR MDs/PROVIDERS TO ORDER:  None today    Malnutrition Status:    Unable to determine    Recommendations already ordered by Registered Dietitian (RD):  None today    Future/Additional Recommendations:  Additional diet education as needed      REASON FOR ASSESSMENT  Portillo Queen is a 25 year old male assessed by the dietitian for Patient/Family Request-\"Obesity, weight loss\"  PMH significant for seizures, HLD, depression, SHERIN, ADHD, DM, HTN and polysubstance abuse admitted for detox.   Findings  Pt requesting wt loss education. Usually has 3 meals/day and snacks at home.    Breakfast: egg sandwich   lunch: usually fast food (chipotle) or skips   Dinner: fast food ([employer])   Snacks: pringles   Will occasionally cook at home. Cooks things like rice, chicken, beef and mexican dishes. Does not eat many fruits/veggies but enjoys broccoli, carrots, pineapple and julio.   YEK=[id number]#  Wt Readings from Last 10 Encounters:   03/02/21 138.8 kg (306 lb)   06/25/20 130.6 kg (288 lb)   01/21/20 122 kg (269 lb)     INTERVENTIONS  Implementation  Nutrition Education: Discussed general healthy diet using my plate. Encouraged pt to cook at home when able which pt believes he should be able to do more of. Discussed choosing healthier side options such as veggies/salad when out to eat or, if these options arent available, eating veggies from home with ast food meal. Discussed healthy snacks incorporating complex CHO and protein. Snack and myplate handout tubed to unit.      Follow up/Monitoring  No nutrition follow-up warranted at this time. RD to sign off. Please consult if further needs arise    Lisa Kaminski MS, RD, LDN  Unit Pager [phone]        "

## 2021-03-04 LAB
ALBUMIN SERPL-MCNC: 4.2 G/DL (ref 3.4–5)
ALP SERPL-CCNC: 71 U/L (ref 40–150)
ALT SERPL W P-5'-P-CCNC: 118 U/L (ref 0–70)
ANION GAP SERPL CALCULATED.3IONS-SCNC: 3 MMOL/L (ref 3–14)
AST SERPL W P-5'-P-CCNC: 42 U/L (ref 0–45)
BILIRUB SERPL-MCNC: 0.8 MG/DL (ref 0.2–1.3)
BUN SERPL-MCNC: 18 MG/DL (ref 7–30)
CALCIUM SERPL-MCNC: 9.5 MG/DL (ref 8.5–10.1)
CHLORIDE SERPL-SCNC: 102 MMOL/L (ref 94–109)
CO2 SERPL-SCNC: 31 MMOL/L (ref 20–32)
CREAT SERPL-MCNC: 1.02 MG/DL (ref 0.66–1.25)
GFR SERPL CREATININE-BSD FRML MDRD: >90 ML/MIN/{1.73_M2}
GLUCOSE SERPL-MCNC: 90 MG/DL (ref 70–99)
POTASSIUM SERPL-SCNC: 4.4 MMOL/L (ref 3.4–5.3)
PROT SERPL-MCNC: 7.3 G/DL (ref 6.8–8.8)
SODIUM SERPL-SCNC: 136 MMOL/L (ref 133–144)

## 2021-03-04 PROCEDURE — 250N000013 HC RX MED GY IP 250 OP 250 PS 637: Performed by: PSYCHIATRY & NEUROLOGY

## 2021-03-04 PROCEDURE — 99207 PR CDG-MDM COMPONENT: MEETS MODERATE - DOWN CODED: CPT | Performed by: PSYCHIATRY & NEUROLOGY

## 2021-03-04 PROCEDURE — H0001 ALCOHOL AND/OR DRUG ASSESS: HCPCS

## 2021-03-04 PROCEDURE — 250N000013 HC RX MED GY IP 250 OP 250 PS 637: Performed by: CLINICAL NURSE SPECIALIST

## 2021-03-04 PROCEDURE — 36415 COLL VENOUS BLD VENIPUNCTURE: CPT | Performed by: PHYSICIAN ASSISTANT

## 2021-03-04 PROCEDURE — 128N000004 HC R&B CD ADULT

## 2021-03-04 PROCEDURE — 99232 SBSQ HOSP IP/OBS MODERATE 35: CPT | Performed by: PSYCHIATRY & NEUROLOGY

## 2021-03-04 PROCEDURE — 80053 COMPREHEN METABOLIC PANEL: CPT | Performed by: PHYSICIAN ASSISTANT

## 2021-03-04 RX ORDER — PHENOBARBITAL 32.4 MG/1
32.4 TABLET ORAL 2 TIMES DAILY
Qty: 3 TABLET | Refills: 0 | Status: SHIPPED | OUTPATIENT
Start: 2021-03-04

## 2021-03-04 RX ORDER — METFORMIN HCL 500 MG
500 TABLET, EXTENDED RELEASE 24 HR ORAL
Qty: 30 TABLET | Refills: 0 | Status: SHIPPED | OUTPATIENT
Start: 2021-03-05

## 2021-03-04 RX ORDER — TRAZODONE HYDROCHLORIDE 50 MG/1
50 TABLET, FILM COATED ORAL
Qty: 30 TABLET | Refills: 0 | Status: SHIPPED | OUTPATIENT
Start: 2021-03-04

## 2021-03-04 RX ORDER — ATORVASTATIN CALCIUM 10 MG/1
10 TABLET, FILM COATED ORAL DAILY
Qty: 30 TABLET | Refills: 0 | Status: SHIPPED | OUTPATIENT
Start: 2021-03-04 | End: 2021-04-03

## 2021-03-04 RX ORDER — LISINOPRIL 30 MG/1
30 TABLET ORAL DAILY
Qty: 30 TABLET | Refills: 0 | Status: SHIPPED | OUTPATIENT
Start: 2021-03-04

## 2021-03-04 RX ORDER — FLUOXETINE 40 MG/1
40 CAPSULE ORAL DAILY
Qty: 30 CAPSULE | Refills: 0 | Status: SHIPPED | OUTPATIENT
Start: 2021-03-04

## 2021-03-04 RX ORDER — GABAPENTIN 300 MG/1
300 CAPSULE ORAL 3 TIMES DAILY PRN
Qty: 90 CAPSULE | Refills: 0 | Status: SHIPPED | OUTPATIENT
Start: 2021-03-04

## 2021-03-04 RX ORDER — MULTIPLE VITAMINS W/ MINERALS TAB 9MG-400MCG
1 TAB ORAL DAILY
Qty: 30 TABLET | Refills: 0 | Status: SHIPPED | OUTPATIENT
Start: 2021-03-05

## 2021-03-04 RX ORDER — ERGOCALCIFEROL 1.25 MG/1
50000 CAPSULE, LIQUID FILLED ORAL WEEKLY
Qty: 4 CAPSULE | Refills: 1 | Status: SHIPPED | OUTPATIENT
Start: 2021-03-04

## 2021-03-04 RX ORDER — LANOLIN ALCOHOL/MO/W.PET/CERES
100 CREAM (GRAM) TOPICAL DAILY
Qty: 30 TABLET | Refills: 0 | Status: SHIPPED | OUTPATIENT
Start: 2021-03-05

## 2021-03-04 RX ADMIN — MULTIPLE VITAMINS W/ MINERALS TAB 1 TABLET: TAB at 08:30

## 2021-03-04 RX ADMIN — PHENOBARBITAL 32.4 MG: 32.4 TABLET ORAL at 08:30

## 2021-03-04 RX ADMIN — THIAMINE HCL TAB 100 MG 100 MG: 100 TAB at 08:31

## 2021-03-04 RX ADMIN — PHENOBARBITAL 32.4 MG: 32.4 TABLET ORAL at 20:17

## 2021-03-04 RX ADMIN — FLUOXETINE 40 MG: 20 CAPSULE ORAL at 08:30

## 2021-03-04 RX ADMIN — METFORMIN HYDROCHLORIDE 500 MG: 500 TABLET, EXTENDED RELEASE ORAL at 08:30

## 2021-03-04 RX ADMIN — ATORVASTATIN CALCIUM 10 MG: 10 TABLET, FILM COATED ORAL at 08:30

## 2021-03-04 RX ADMIN — GABAPENTIN 300 MG: 300 CAPSULE ORAL at 08:30

## 2021-03-04 RX ADMIN — LISINOPRIL 30 MG: 20 TABLET ORAL at 08:30

## 2021-03-04 RX ADMIN — TRAZODONE HYDROCHLORIDE 50 MG: 50 TABLET ORAL at 20:17

## 2021-03-04 RX ADMIN — PHENOBARBITAL 32.4 MG: 32.4 TABLET ORAL at 14:50

## 2021-03-04 RX ADMIN — FOLIC ACID 1 MG: 1 TABLET ORAL at 08:30

## 2021-03-04 ASSESSMENT — ACTIVITIES OF DAILY LIVING (ADL)
ORAL_HYGIENE: INDEPENDENT
HYGIENE/GROOMING: INDEPENDENT
DRESS: INDEPENDENT

## 2021-03-04 NOTE — PROGRESS NOTES
Pt completed CD assessment. Reports interest again in LP+ program. Pt states family will be able to cover the deposit for room and board. Pt was given phone number to financial counseling to make payment. Pt may transfer to [facility]+ when out-of-detox and bed is available. Will update.    Update: Pt can transfer to [facility]+ on 3/5 after phenobarbital taper begins. Bed in [facility]+ saved for pt. Spoke with pt's mother who reports she is making the deposit for room and board today. AVS updated.

## 2021-03-04 NOTE — PROGRESS NOTES
03/04/21 0607   Sleep/Rest/Relaxation   Sleep/Rest/Relaxation appears asleep   Night Time # Hours 6 hours     MSSA=4; patient denied having withdrawal symptoms.
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
## 2021-03-04 NOTE — PROGRESS NOTES
Brief medicine note:     Followed up on patients CMP. Please see consult note 3/2 for details.     Transaminitis   On admission , AST 47. Tbili and alk phos wnl. GGT elevated at 178. Elevated in liver enzymes likely 2/2 ETOH use however not consistent with typical 2:1 ratio seen in alcohol hepatitis. Denied abdominal pain, nausea, vomiting. Per chart review, tolerating diet. Repeat , ALT wnl. Tbili and Alk phos remain wnl.   - Follow up with PCP in 1-2 weeks for repeat CMP   - Encourage alcohol cessation     Currently patient is medically stable and medicine will sign off. Thank you for allowing us to be a part of this patients care. Please notify on call [facility] if any intercurrent medical issues arise.     Maira Dixon PA-C  Internal Medicine [facility] Hospitalist   Contact information available via [facility] Paging/Directory

## 2021-03-04 NOTE — PLAN OF CARE
"S:  Portillo (goes by Mario) has been visible in the milieu throughout the evening. He was on the phone with family and friends almost constantly the first half of the evening shift.  He even spoke with his grandmother in [place].He participated in group AA group.  He is eating and drinking normally.  He denies any thoughts of self harm, suicide or thoughts of harming others.  He endorses depression, rating it at a \"7\", he rated his anxiety at a \"5-6\".  He exhibited no signs of sedation.  B: Pt admitted for alcohol/benzodiazepine (BZ) withdrawal and detoxification.  A:  Pt in minimal alcohol/BZ withdrawal AEB MSSA scores of 6 & 6.  R:  Administered regularly scheduled phenobarbital.  He did not require any medication for alcohol withdrawal this shift. Continue to monitor and medicate as ordered and indicated.   "

## 2021-03-04 NOTE — PLAN OF CARE
"  Pt presents with blunted affect. Affect seen to be full range intermittently during interactions. Mood calm and depressed. Pt seen visible in the milieu for majority of shift.     Pt states his appetite is \"good.\" When asked about sleep hygiene Pt states \"it was alright.\" Pt endorses generalized body pain/soreness. Pt declines intervention and explains this pain/soreness has lessoned throughout this shift. Pt denies any adverse/side effects to medications.     Pt rates his anxiety 7-8/10 on a numerical 0-10 scale. PRN Gabapentin administered (see MAR). Pt rates his depression 7/10 on the same scale. Pt denies SI/SIB/HI. Pt denies AH/VH.   "

## 2021-03-04 NOTE — PROGRESS NOTES
"[facility], [place]   Psychiatric Progress Note        Interim history   This is a 25 year old male with 1.  Alcohol use disorder, severe.   2.  Alcohol withdrawal, severe.     3.  Xanax use disorder.     4.  Xanax withdrawal, severe.   5.  Marijuana use disorder, severe.   6.  Major depressive disorder, recurrent, severe without psychosis.   7.  Posttraumatic stress disorder, chronic. .Pt seen in rounds.   The patient's care was discussed with the treatment team during the daily team meeting and/or staff's chart notes were reviewed.  Staff report patient has been visible in the milieu,  no acute eventsovernight.     Patient's mood is positive   Energy Level:MODERATE  Sleep:poor   Appetite:fine, low  motivation interest   Suicidal/homicidal ideation/plan intent.psychosis  No prior suicde attempts  No access to gun  Pt is in alcohol withdrawal still being monitered every 4 hrs for it,   Pt mssa score are monitered  Tolerating meds and has no side effects.              Medications:     Current Facility-Administered Medications   Medication     atorvastatin (LIPITOR) tablet 10 mg     diazepam (VALIUM) tablet 5-20 mg     FLUoxetine (PROzac) capsule 40 mg     folic acid (FOLVITE) tablet 1 mg     gabapentin (NEURONTIN) capsule 300 mg     lisinopril (ZESTRIL) tablet 30 mg     metFORMIN (GLUCOPHAGE-XR) 24 hr tablet 500 mg     multivitamin w/minerals (THERA-VIT-M) tablet 1 tablet     PHENobarbital (LUMINAL) tablet 32.4 mg     thiamine (B-1) tablet 100 mg     traZODone (DESYREL) tablet 50 mg     [START ON 3/5/2021] vitamin D2 (ERGOCALCIFEROL) 26768 units (1250 mcg) capsule 50,000 Units             Allergies:     Allergies   Allergen Reactions     Amoxicillin Rash     Penicillins Rash     Sulfa Drugs Rash            Psychiatric Examination:   Blood pressure (!) 136/94, pulse 76, temperature 97.4  F (36.3  C), temperature source Temporal, resp. rate 16, height 1.905 m (6' 3\"), weight 138.8 kg (306 " lb), SpO2 99 %.  Weight is 306 lbs 0 oz  Body mass index is 38.25 kg/m .    Appearance:  awake, alert and adequately groomed  Attitude:  cooperative  Eye Contact:  good  Mood:  better  Affect:  appropriate and in normal range and mood congruent  Speech:  clear, coherent rate /rhythm are good  Psychomotor Behavior:  no evidence of tardive dyskinesia, dystonia, or tics and intact station, gait and muscle tone  Throught Process:  logical  Associations:  no loose associations  Thought Content:  no evidence of suicidal ideation or homicidal ideation, no evidence of psychotic thought, no auditory hallucinations present and no visual hallucinations present  Insight:  fair  Judgement:  intact  Oriented to:  time, person, and place  Attention Span and Concentration:  intact  Recent and Remote Memory:  intact  Language fund of knowledge are adequate         Labs:     No results found for: NTBNPI, NTBNP  Lab Results   Component Value Date    WBC 8.5 03/02/2021    HGB 17.5 03/02/2021    HCT 50.5 03/02/2021    MCV 86 03/02/2021     03/02/2021     No results found for: TSH      DX 1.  Alcohol use disorder, severe.   2.  Alcohol withdrawal, severe.     3.  Xanax use disorder.     4.  Xanax withdrawal, severe.   5.  Marijuana use disorder, severe.   6.  Major depressive disorder, recurrent, severe without psychosis.   7.  Posttraumatic stress disorder, chronic.      PLAN  Patient is out of alcohol detox    MSSA    Eating Disturbances: ate and enjoyed all of it or not applicable  Tremor: 0 - no tremor  Sleep Disturbance: slept through the night or not applicable  Clouding of Sensorium: no evidence  Hallucinations: 0 - none  Quality of Contact: 0 - awareness of examiner and people around him/her  Agitation: 0 - normal activity  Paroxysmal Sweats: 1 - barely perceptible sweating  Temperature: 99.5 or below  Pulse: 1 - 70 to 79  Total MSSA Score: 3    Continue to detox from benzos using phenobarbital  Patient is on 30 mg 3 times  a day his U tox is negative for benzos  We will start taper tomorrow    We will continue Prozac for his PTSD  AST is normalized  previous was 116 we will continue to monitor  Laboratory/Imaging: reviewed with patient   Consults: internal medicine consult reviewed  Patient will be treated in therapeutic milieu with appropriate individual and group therapies as described.  PDMP CHECKED     Supportive psychotheraoy provided, mian talked about recovery enviroment, relapse prevention, triggers to use.  Discussed with patient many issues of addiction,triggers, relapse, and establishing a solid recovery program.  Asked pt to be med complinat   Medical diagnoses to be addressed this admission:    Plan:  Assessment & Plan     Portillo Queen is a 25 year old male admitted on 3/2/2021. He has a history of depression, SHERIN, ADHD, polysubstance abuse who is admitted to  for detox from alcohol and xanax.      Polysubstance abuse   Reports use of ETOH, cocaine, xanax and THC. Reports he drinks about ten drinks at least two days per week. Uses Xanax 4-6 mg per day. Denies hx of withdrawal seizures or DTs. Utox positive for cannabinoids, negative for benzodiazepine, ETOH, cocaine.   - Management per psychiatry      ADHD   Depression   Anxiety   - Management per primary team      [facility] hypertension  PTA on lisinopril 30 mg daily. BP slightly elevated in the setting of withdrawal.   - Continue PTA lisinopril      Transaminitis   , AST 47. Tbili and alk phos wnl. GGT elevated at 178. Elevated in liver enzymes likely 2/2 ETOH use however not consistent with typical 2:1 ratio seen in alcohol hepatitis. Denies abdominal pain, nausea, vomiting.   - Repeat CMP in 48 hours      Obesity   BMI 36   PTA on metformin 500 mg daily. Cr stable.   - Continue PTA metformin   - Continue PTA Lipitor            The patient's care was discussed with the Bedside Nurse and Patient.       Legal Status: voluntary    Safety Assessment:    Checks:  15 min  Precautions: withdrawal precautions  Pt has not required locked seclusion or restraints in the past 24 hours to maintain safety, please refer to RN documentation for further details.  Discussed with patient many issues of addiction,triggers, relapse, and establishing a solid recovery program.  Able to give informed consent:  YES   Discussed Risks/Benefits/Side Effects/Alternatives: YES    After discussion of the indications, risks, benefits, alternatives and consequences of no treatment, the patient elects to complete detox and do treatment

## 2021-03-05 ENCOUNTER — BEH TREATMENT PLAN (OUTPATIENT)
Dept: BEHAVIORAL HEALTH | Facility: CLINIC | Age: 26
End: 2021-03-05
Attending: FAMILY MEDICINE

## 2021-03-05 ENCOUNTER — TELEPHONE (OUTPATIENT)
Dept: BEHAVIORAL HEALTH | Facility: CLINIC | Age: 26
End: 2021-03-05

## 2021-03-05 ENCOUNTER — HOSPITAL ENCOUNTER (OUTPATIENT)
Dept: BEHAVIORAL HEALTH | Facility: CLINIC | Age: 26
End: 2021-03-05
Attending: FAMILY MEDICINE
Payer: COMMERCIAL

## 2021-03-05 VITALS
DIASTOLIC BLOOD PRESSURE: 82 MMHG | BODY MASS INDEX: 38.05 KG/M2 | RESPIRATION RATE: 16 BRPM | TEMPERATURE: 97.4 F | SYSTOLIC BLOOD PRESSURE: 142 MMHG | WEIGHT: 306 LBS | HEART RATE: 74 BPM | OXYGEN SATURATION: 97 % | HEIGHT: 75 IN

## 2021-03-05 VITALS — OXYGEN SATURATION: 97 % | TEMPERATURE: 97.2 F

## 2021-03-05 DIAGNOSIS — F19.20 CHEMICAL DEPENDENCY (H): ICD-10-CM

## 2021-03-05 PROCEDURE — 250N000013 HC RX MED GY IP 250 OP 250 PS 637: Performed by: PSYCHIATRY & NEUROLOGY

## 2021-03-05 PROCEDURE — 1002N00001 HC LODGING PLUS FACILITY CHARGE ADULT

## 2021-03-05 PROCEDURE — 99239 HOSP IP/OBS DSCHRG MGMT >30: CPT | Performed by: PSYCHIATRY & NEUROLOGY

## 2021-03-05 RX ORDER — AMOXICILLIN 250 MG
2 CAPSULE ORAL DAILY PRN
COMMUNITY
End: 2021-03-28

## 2021-03-05 RX ORDER — ACETAMINOPHEN 325 MG/1
325-650 TABLET ORAL EVERY 4 HOURS PRN
COMMUNITY
End: 2021-03-28

## 2021-03-05 RX ORDER — LANOLIN ALCOHOL/MO/W.PET/CERES
3 CREAM (GRAM) TOPICAL
COMMUNITY
End: 2021-03-28

## 2021-03-05 RX ORDER — IBUPROFEN 200 MG
400 TABLET ORAL EVERY 6 HOURS PRN
COMMUNITY
End: 2021-03-28

## 2021-03-05 RX ORDER — MAGNESIUM HYDROXIDE/ALUMINUM HYDROXICE/SIMETHICONE 120; 1200; 1200 MG/30ML; MG/30ML; MG/30ML
30 SUSPENSION ORAL EVERY 6 HOURS PRN
COMMUNITY
End: 2021-03-28

## 2021-03-05 RX ORDER — LORATADINE 10 MG/1
10 TABLET ORAL DAILY PRN
COMMUNITY
End: 2021-03-28

## 2021-03-05 RX ADMIN — MULTIPLE VITAMINS W/ MINERALS TAB 1 TABLET: TAB at 08:25

## 2021-03-05 RX ADMIN — THIAMINE HCL TAB 100 MG 100 MG: 100 TAB at 08:24

## 2021-03-05 RX ADMIN — METFORMIN HYDROCHLORIDE 500 MG: 500 TABLET, EXTENDED RELEASE ORAL at 08:25

## 2021-03-05 RX ADMIN — ERGOCALCIFEROL 50000 UNITS: 1.25 CAPSULE, LIQUID FILLED ORAL at 08:25

## 2021-03-05 RX ADMIN — LISINOPRIL 30 MG: 20 TABLET ORAL at 08:25

## 2021-03-05 RX ADMIN — ATORVASTATIN CALCIUM 10 MG: 10 TABLET, FILM COATED ORAL at 08:24

## 2021-03-05 RX ADMIN — FLUOXETINE 40 MG: 20 CAPSULE ORAL at 08:24

## 2021-03-05 RX ADMIN — FOLIC ACID 1 MG: 1 TABLET ORAL at 08:25

## 2021-03-05 ASSESSMENT — ANXIETY QUESTIONNAIRES
1. FEELING NERVOUS, ANXIOUS, OR ON EDGE: NEARLY EVERY DAY
GAD7 TOTAL SCORE: 19
7. FEELING AFRAID AS IF SOMETHING AWFUL MIGHT HAPPEN: NEARLY EVERY DAY
6. BECOMING EASILY ANNOYED OR IRRITABLE: NEARLY EVERY DAY
3. WORRYING TOO MUCH ABOUT DIFFERENT THINGS: NEARLY EVERY DAY
2. NOT BEING ABLE TO STOP OR CONTROL WORRYING: NEARLY EVERY DAY
5. BEING SO RESTLESS THAT IT IS HARD TO SIT STILL: MORE THAN HALF THE DAYS
IF YOU CHECKED OFF ANY PROBLEMS ON THIS QUESTIONNAIRE, HOW DIFFICULT HAVE THESE PROBLEMS MADE IT FOR YOU TO DO YOUR WORK, TAKE CARE OF THINGS AT HOME, OR GET ALONG WITH OTHER PEOPLE: VERY DIFFICULT
4. TROUBLE RELAXING: MORE THAN HALF THE DAYS

## 2021-03-05 ASSESSMENT — ACTIVITIES OF DAILY LIVING (ADL)
ORAL_HYGIENE: INDEPENDENT
LAUNDRY: WITH SUPERVISION
DRESS: STREET CLOTHES
HYGIENE/GROOMING: SHOWER

## 2021-03-05 ASSESSMENT — PATIENT HEALTH QUESTIONNAIRE - PHQ9: SUM OF ALL RESPONSES TO PHQ QUESTIONS 1-9: 17

## 2021-03-05 NOTE — PLAN OF CARE
Problem: Sleep Disturbance  Goal: Adequate Sleep/Rest  Intervention: Promote Sleep/Rest  Flowsheets (Taken 3/5/2021 6452)  Sleep/Rest Enhancement:   awakenings minimized   noise level reduced  Patient observed sleeping throughout the night except for a brief moment to use the restroom. Out of detox; no problems identified.
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
## 2021-03-05 NOTE — PLAN OF CARE
Problem: Adult Inpatient Plan of Care  Goal: Plan of Care Review  Outcome: Adequate for Discharge  Flowsheets (Taken 3/5/2021 3586)  Plan of Care Reviewed With: patient  Progress: improving     Pt is out of detox.  Pt denies SI/SIB/HI/hallucinations/anxiety. Pt reports some sadness in the context of missing his family. Pt has full range of affect, mood is calm. Pt showered. Pt ate meals. Pt reports readiness for [facility]. Pt is med compliant. VSS    Plan  Discharge to [facility].     Report given to LULU Eugene in [facility].

## 2021-03-05 NOTE — PROGRESS NOTES
"[facility] Health Assessment      Vital signs:     There were no vitals taken for this visit.      Transfer from     Counselor: Lata  Drug of Choice: ETOH and benzodiazpine  Last use: March 2nd  Home clinic/MD: Kim Hoang MD    [address] [place] [address]    Phone: [phone]    Fax: [phone]   Kim Guardado MD    [address] [place] [address]    Phone: [phone]    Fax: [phone]       Dorina Rodriguez, [facility]  Care coordinator  [address] [place] [address]    [phone]   Psychiatrist/therapist: none  Medical history/current conditions: High cholesterol, hypertension    H&P Screen:  H&P within the last 90 days: Yes.  Date: 3/5/21 Location:       Mental Health diagnosis: ADHD   Depression   Anxiety   Medication compliant?: yes  Recent sucidal thoughts? no    When? na  Current thought of self-harm? no    Plan? na    Pain assessment:   Pt. Experiencing pain at this time?  No  [facility] Medical Screen for COVID19    In the last 2 weeks have you been in an large group gatherings (more than 10 people)? no    Have you been covering your nose and mouth while out in the community? yes    Have you come in contact with anyone in the last month who \"was suspected of\" or \"tested positive\" for COVID-19? no    Have you tested positive for COVID-19 in the past 90 days? no  -If yes pt should not be re-tested until 90 days from day one of symptom onset   UNLESS patient is COVID symptomatic, contact infection prevention for recommendation    \"Do you\" or \"have you\" had....any of the following symptoms in the last 2 weeks? no      Fever or chills     Cough     Shortness of breath or difficulty breathing     New muscle or body aches    New headache     New loss of taste or smell    Sore throat     Congestion or runny nose     New and unexplained Nausea or vomiting     Diarrhea    New and unexplained fatigue    Does " the above COVID screen need to be reviewed by Infection Prevention? no    COVID TEST COMPLETED BY [facility] ? Completed in detox    COVID-19 - Pt informed of the following while at LP:    1)Staff will take temperature and O2Sats twice daily    2) Practice good hand washing hygiene and avoid touching face    3) If pt has any of the symptoms below, notify staff immediately.      Fever     Cough     Shortness of breath or difficulty breathing     Chills     Repeated shaking with chills    Muscle pain     4) COVID testing maybe initiated at admission. Depending on the situation amd symptoms patients may be tested more than once during their stay.  Patient will be required to stay in room until lab results confirm negative. If you are unable to stay in your room you may be asked to leave the program.  If COVID results are positive, You will have to exit the program quarantine as recommended per CDC and then may return for CD treatment after symptoms have resolved    5) Per COVID protocol, during your stay at , social distancing is required AND mouth and nose must be covered at all times with facial mask while out in milieu.      6) Patients will not be allowed to go to any outside appointments, all outside appointments will need to be virtual or by phone      Integrative Therapies: Essential Oils    Patient requesting essential oil inhaler to manage (Mood/Mental Health/Physical/Spiritual symptoms).     Discussed appropriate use of essential oil inhalers and instructed patient not to leave labeled product out on unit.     Patient was screened for kidney disease, asthma/reactive airway disease and rashes and wounds or 1st trimester of pregnancy    List Essential Oils requested by pt calm, therabreathe and sweet orange    Patient verbalized and demonstrated understanding of how to use essential oil inhaler correctly and will notify LP RN with any concerns or side effects. Patient agrees not to share their essential oil inhaler  with other clients.  Continue to support the patient in safely utilizing integrative therapies as able to manage symptoms during treatment.       Patient tobacco use: very infrequent vaping    Are you interested in quitting? no    NRT (Nicotine Replacement therapy) ordered? declined   Pt is aware of the dangers of tobacco cessation and in contemplation.    Pt given written education.          Nutritional Assessment:    Have you ever purged, binged or restricted yourself as a way to control your weight?   No     Are you on a special diet?   No     Do you have any concerns regarding your nutritional status?   No     Have you had any appetite changes in the last 3 months?   No   Have you had weight loss or weight gain of more than 10 lbs in the last 3 months?   If patient gained or lost more than 10 lbs, then refer to program RN / attending Physician for assessment.   No   Was the patient informed of BMI? 38.25 kg/m Abnormal      Above,  General nutrition education, Pt will attend mandatory nurse lecture discussing weight management and diet    Yes   Have you engaged in any risk-taking behavior that would put you at risk for exposure to blood-borne or sexually transmitted diseases?   No   Do you have any dental problems?   No             Nursing Assessment Summary:  As above    On-going nursing intervention required?   No    Acute care visit recommended: no

## 2021-03-05 NOTE — PROGRESS NOTES
Name: Portillo Queen  Date: 3/5/2021  Medical Record: [id number]    Envelope Number:[id number]    List of Contents (List each item separately in new row):   Clear eyes    Admission:  I am responsible for any personal items that are not sent to the safe or pharmacy.  [facility] is not responsible for loss, theft or damage of any property in my possession.      Patient Signature:  ___________________________________________       Date/Time:__________________________    Staff Signature: __________________________________       Date/Time:__________________________    2nd Staff person, if patient is unable/unwilling to sign:      __________________________________________________________       Date/Time: __________________________      Discharge:  [facility] has returned all of my personal belongings:    Patient Signature: ________________________________________     Date/Time: ____________________________________    Staff Signature: ______________________________________     Date/Time:_____________________________________

## 2021-03-05 NOTE — PLAN OF CARE
Benzo's MSSA 6 & 3. Pt has been social and watching tv most of the evening. VSS. OOD. He has had a good evening. Has settled well at HS.

## 2021-03-05 NOTE — DISCHARGE INSTRUCTIONS
Behavioral Discharge Planning and Instructions  THANK YOU FOR CHOOSING [facility]  3[facility]  [phone]    Summary: You were admitted to [facility] on 3/2/21 for detoxification from alcohol and benzodiazepines.  A medical exam was performed that included lab work. You have met with a  and opted to attend treatment at [facility].  Please take care and make your recovery a daily priority, Portillo! It was a pleasure working with you and the entire treatment team here wishes you the very best in your recovery!     Recommendation:  28 days in [facility]    Main Diagnoses:  Per Dr. Shahida Peguero MD:  Alcohol Use Disorder-Severe  Benzodiazepine Use Disorder-Severe    Major Treatments, Procedures and Findings:  You were treated for alcohol and benzodiazepine detoxification using MSSA protocol and phenobarbital.  You had a chemical dependency assessment. You had labs drawn and those results were reviewed with you. Please take a copy of your lab work with you to your next primary care provider appointment.    Symptoms to Report:  If you experience more anxiety, confusion, sleeplessness, deep sadness or thoughts of suicide, notify your treatment team or notify your primary care provider. IF ANY OF THE SYMPTOMS YOU ARE EXPERIENCING ARE A MEDICAL EMERGENCY CALL 911 IMMEDIATELY.     Lifestyle Adjustment: Adjust your lifestyle to get enough sleep, relaxation, exercise and good nutrition. Continue to develop healthy coping skills to decrease stress and promote a sober living environment. Do not use mood altering substances including alcohol, illegal drugs or addictive medications other than what is currently prescribed.     Disposition: [facility]    Facts about COVID19 at www.cdc.gov/COVID19 and www.[place].gov/covid19    Keeping hands clean is one of the most important steps we can take to avoid getting sick and spreading germs to others.  Please wash your hands frequently and lather with soap  for at least 20 seconds!    Treatment Follow-Up:  [facility]  Admission: Friday March 5th at 12:45 pm  [address] [place] [address]  [phone]    Medical Follow-Up:  Per Internal Medicine Maira Dixon PA-C:  Follow up with Primary Care Provider in 1-2 weeks for repeat CMP   Dr. Kim Nash [facility]   [address] [place] [address]  [phone]  Your appointment is April 12, 2021 at 2:00PM      Recovery apps for your phone to locate current in person and zoom recovery meetings  [place] - meeting jannette  AA  - meeting jannette  Meeting guide - meeting jannette  Quick NA meeting - meeting jannette  Tolu- has various apps    Resources:  *due to covid-19 most AA/NA meetings are being held online*  AA meetings online search for them at: https://aa-intergroup.org (worldwide meeting listings)  AA meetings for [place] area can be found online at: https://a[place].org (click local online meetings listings)  NA meetings for [place] area can be found online at: https://www.nam[place].org  (click find a meeting)  AA and NA Sponsors are excellent resources for support and you can find one at any support group meeting.   Alcoholics Anonymous (https://aa.org/): for information 24 hours/day  AA Intergroup service office in [place] (http://www.aastpaul.org/) 185.682.9228  AA Intergroup service office in [facility]: [phone]. (http://www.a[place].org/)  Narcotics Anonymous (www.nam[place].org) (212) 151-6509  https://aafairviewriverside.org/meetings  SMART Recovery - self management for addiction recovery:  www.smartrecovery.org  Pathways ~ A Health Crisis Resource & Support Center:  490.893.6551.  https://prescribetoprevent.org/patient-education/videos/  http://www.harmreduction.org  [facility] 203-724-3797  Support Group:  AA/NA and Sponsor/support.  National [place] on Mental Illness (www.[place].gustavo.org): 734.737.2370 or  561.297.3024.  Alcoholics Anonymous (www.alcoholics-anonymous.org): Check your phone book for your local chapter.  Suicide Awareness Voices of Education (SAVE) (www.save.org): 909-720-CBZG (6775)  National Suicide Prevention Line (www.mentalhealthmn.org): 809-587-ECDL (7473)  Mental Health Consumer/Survivor Network of [place] (www.mhcsn.net): 407.961.2507 or 567-679-9450  Mental Health Association of [place] (www.mentalhealth.org): 927.429.1577 or 599-936-6781   Substance Abuse and Mental Health Services (www.samhsa.gov)  [place] Opioid Prevention Coalition: www.opioidcoalition.org    [place] Recovery Connection (MRC)  [facility] connects people seeking recovery to resources that help foster and sustain long-term recovery.  Whether you are seeking resources for treatment, transportation, housing, job training, education, health care or other pathways to recovery, [facility] is a great place to start.  743.238.4315.  www.AIRSIS.Klip Pod casts for nutrition and wellness  Listen on Apple Podcasts  Dishing [employer] Nutrition   [employer]   Nutrition       Understand the connection between what you eat and how you feel. Hosted by licensed nutritionists and dietitians from [employer] we share practical, real-life solutions for healthier living through nutrition.     General Medication Instructions:   See your medication sheet(s) for instructions.   Take all medications as prescribed.  Make no changes unless your primary care provider suggests them.   Go to all your primary care provider visits.  Be sure to have all your required lab tests. This way, your medicines can be refilled on time.  Do not use any forms of alcohol.    Please Note:  If you have any questions at anytime after you are discharged please call [facility] detox unit [facility] at [phone].  [facility], Behavioral Intake [phone]  Medical Records call [phone]  Outpatient Behavioral Intake call  [phone]  [facility] Wait List/Bed Availability call [phone]    Please remember to take all of your behavioral discharge planning and lab paperwork to any follow up appointments, it contains your lab results, diagnosis, medication list and discharge recommendations.      THANK YOU FOR CHOOSING  [employer] [place]

## 2021-03-05 NOTE — PROGRESS NOTES
Initial Services Plan        Service Initiation Date: 3/5/2021    Immediate health and/or safety concerns: No    Identify health and safety concern(s) below and include plan to address:    None Identified    Treatment suggestions for client during the time between intake (admit date) and completion of the individual treatment plan:     Look for a sober support network, i.e. 12 step, Smart Recovery, Celebrate Recovery, etc  Tour the treatment center or outpatient clinic  Introduce yourself to your treatment group. Spend time getting to know your peers  Review your patient or client handbook  Begin working on your treatment goal list    Completed by: JANN Ponce  Date completed: 3/5/2021 at 1:07 PM

## 2021-03-05 NOTE — DISCHARGE SUMMARY
Portillo Queen MRN# [id number]   Age: 25 year old YOB: 1995     Date of Admission:  3/2/2021  Date of Discharge:  3/5/2021  Admitting Physician:  Shahida Peguero MD  Discharge Physician:  Shahida Peguero MD      DISCHARGE  DX    1.  Alcohol use disorder, severe.       3.  Xanax use disorder.     Benzodiazepine use disorder severe    5.  Marijuana use disorder, severe.   6.  Major depressive disorder, recurrent, severe without psychosis.   7.  Posttraumatic stress disorder, chronic.          Event Leading to Hospitalization:     See Admission note by admitting provider for patient encounter. for additional details.          Hospital Course:   PATIENT was admitted to [facility] attending  under DR peguero, please review the detailed admit note on 3/2/2021   The patient was placed under status 15 (15 minute checks) to ensure patient safety.   MSSA protocol was initiated due to the patient's history of alcohol abuse and concern for withdrawal symptoms.  Patient was detox of benzodiazepines using phenobarbital he started the taper and was tolerating the taper  All outpatient medications were continued    PATIENTdid participate in groups and was visible in the milieu.     The patient's symptoms of alcohol withdrawal symptoms improved improved.     Patients energy motivation , sleep appetite improved.  Pt completed detox . It was un eventful.      Discussed with patient medications for craving.  Spoke with patient about triggers coping skills relapse prevention.    CONSULTS DONE DURING PATIENTS HOSPITALIZATION.  Patient was seen by medicine on date 3/2/2021    This as per their medical consult    Assessment & Plan     Portillo Queen is a 25 year old male admitted on 3/2/2021. He has a history of depression, SHERIN, ADHD, polysubstance abuse who is admitted to 3A for detox from alcohol and xanax.      Polysubstance abuse   Reports use of ETOH, cocaine, xanax and THC. Reports he drinks about ten  drinks at least two days per week. Uses Xanax 4-6 mg per day. Denies hx of withdrawal seizures or DTs. Utox positive for cannabinoids, negative for benzodiazepine, ETOH, cocaine.   - Management per psychiatry      ADHD   Depression   Anxiety   - Management per primary team      [facility] hypertension  PTA on lisinopril 30 mg daily. BP slightly elevated in the setting of withdrawal.   - Continue PTA lisinopril      Transaminitis   , AST 47. Tbili and alk phos wnl. GGT elevated at 178. Elevated in liver enzymes likely 2/2 ETOH use however not consistent with typical 2:1 ratio seen in alcohol hepatitis. Denies abdominal pain, nausea, vomiting.   - Repeat CMP in 48 hours      Obesity   BMI 36   PTA on metformin 500 mg daily. Cr stable.   - Continue PTA metformin   - Continue PTA Lipitor                  Pt was seen by cm  As per recommendations from cm    Pt approved for LP+, will transfer today, 3/5 at 12:45 pm. AVS complete.         Labs:reviewed with patient       Recent Results (from the past 48 hour(s))   Comprehensive metabolic panel    Collection Time: 03/04/21  6:34 AM   Result Value Ref Range    Sodium 136 133 - 144 mmol/L    Potassium 4.4 3.4 - 5.3 mmol/L    Chloride 102 94 - 109 mmol/L    Carbon Dioxide 31 20 - 32 mmol/L    Anion Gap 3 3 - 14 mmol/L    Glucose 90 70 - 99 mg/dL    Urea Nitrogen 18 7 - 30 mg/dL    Creatinine 1.02 0.66 - 1.25 mg/dL    GFR Estimate >90 >60 mL/min/[1.73_m2]    GFR Estimate If Black >90 >60 mL/min/[1.73_m2]    Calcium 9.5 8.5 - 10.1 mg/dL    Bilirubin Total 0.8 0.2 - 1.3 mg/dL    Albumin 4.2 3.4 - 5.0 g/dL    Protein Total 7.3 6.8 - 8.8 g/dL    Alkaline Phosphatase 71 40 - 150 U/L     (H) 0 - 70 U/L    AST 42 0 - 45 U/L         Recent Results (from the past 240 hour(s))   Drug abuse screen 6 urine (tox)    Collection Time: 03/02/21 10:45 AM   Result Value Ref Range    Amphetamine Qual Urine Negative NEG^Negative    Barbiturates Qual Urine Negative NEG^Negative     Benzodiazepine Qual Urine Negative NEG^Negative    Cannabinoids Qual Urine Positive (A) NEG^Negative    Cocaine Qual Urine Negative NEG^Negative    Ethanol Qual Urine Negative NEG^Negative    Opiates Qualitative Urine Negative NEG^Negative   CBC with platelets differential    Collection Time: 03/02/21 10:46 AM   Result Value Ref Range    WBC 8.5 4.0 - 11.0 10e9/L    RBC Count 5.89 4.4 - 5.9 10e12/L    Hemoglobin 17.5 13.3 - 17.7 g/dL    Hematocrit 50.5 40.0 - 53.0 %    MCV 86 78 - 100 fl    MCH 29.7 26.5 - 33.0 pg    MCHC 34.7 31.5 - 36.5 g/dL    RDW 11.7 10.0 - 15.0 %    Platelet Count 282 150 - 450 10e9/L    Diff Method Automated Method     % Neutrophils 65.2 %    % Lymphocytes 24.9 %    % Monocytes 7.0 %    % Eosinophils 1.4 %    % Basophils 0.7 %    % Immature Granulocytes 0.8 %    Nucleated RBCs 0 0 /100    Absolute Neutrophil 5.5 1.6 - 8.3 10e9/L    Absolute Lymphocytes 2.1 0.8 - 5.3 10e9/L    Absolute Monocytes 0.6 0.0 - 1.3 10e9/L    Absolute Eosinophils 0.1 0.0 - 0.7 10e9/L    Absolute Basophils 0.1 0.0 - 0.2 10e9/L    Abs Immature Granulocytes 0.1 0 - 0.4 10e9/L    Absolute Nucleated RBC 0.0    Comprehensive metabolic panel    Collection Time: 03/02/21 10:46 AM   Result Value Ref Range    Sodium 139 133 - 144 mmol/L    Potassium 4.5 3.4 - 5.3 mmol/L    Chloride 108 94 - 109 mmol/L    Carbon Dioxide 25 20 - 32 mmol/L    Anion Gap 6 3 - 14 mmol/L    Glucose 108 (H) 70 - 99 mg/dL    Urea Nitrogen 13 7 - 30 mg/dL    Creatinine 0.87 0.66 - 1.25 mg/dL    GFR Estimate >90 >60 mL/min/[1.73_m2]    GFR Estimate If Black >90 >60 mL/min/[1.73_m2]    Calcium 10.1 8.5 - 10.1 mg/dL    Bilirubin Total 0.6 0.2 - 1.3 mg/dL    Albumin 4.6 3.4 - 5.0 g/dL    Protein Total 8.1 6.8 - 8.8 g/dL    Alkaline Phosphatase 77 40 - 150 U/L     (H) 0 - 70 U/L    AST 47 (H) 0 - 45 U/L   Asymptomatic SARS-CoV-2 COVID-19 Virus (Coronavirus) by PCR    Collection Time: 03/02/21 10:46 AM    Specimen: Nasopharyngeal   Result Value Ref  Range    SARS-CoV-2 Virus Specimen Source Nasopharyngeal     SARS-CoV-2 PCR Result NEGATIVE     SARS-CoV-2 PCR Comment (Note)    GGT    Collection Time: 03/02/21 10:46 AM   Result Value Ref Range     (H) 0 - 75 U/L   Alcohol breath test POCT    Collection Time: 03/02/21 10:58 AM   Result Value Ref Range    Alcohol Breath Test 0 0.00 - 0.01   Comprehensive metabolic panel    Collection Time: 03/04/21  6:34 AM   Result Value Ref Range    Sodium 136 133 - 144 mmol/L    Potassium 4.4 3.4 - 5.3 mmol/L    Chloride 102 94 - 109 mmol/L    Carbon Dioxide 31 20 - 32 mmol/L    Anion Gap 3 3 - 14 mmol/L    Glucose 90 70 - 99 mg/dL    Urea Nitrogen 18 7 - 30 mg/dL    Creatinine 1.02 0.66 - 1.25 mg/dL    GFR Estimate >90 >60 mL/min/[1.73_m2]    GFR Estimate If Black >90 >60 mL/min/[1.73_m2]    Calcium 9.5 8.5 - 10.1 mg/dL    Bilirubin Total 0.8 0.2 - 1.3 mg/dL    Albumin 4.2 3.4 - 5.0 g/dL    Protein Total 7.3 6.8 - 8.8 g/dL    Alkaline Phosphatase 71 40 - 150 U/L     (H) 0 - 70 U/L    AST 42 0 - 45 U/L            Because this patient meets criteria for an Alcohol Use Disorder, I performed the following brief intervention on the date of this note:              1) Expressed concern that the patient is drinking at unhealthy levels known to increase their risk of alcohol related problems              2) Gave feedback linking alcohol use and health, including personalized feedback explaining how alcohol use can interact with their medical and/or psychiatric problems, and with prescribed medications.              3) Advised patient to abstain.    PT counseled on nicotine cessation and nicotine replacement provided    Discussed with patient many issues of addiction,triggers, relapse, and establishing a solid recovery program.    DISCHARGE MENTAL STATUS EXAMINATION:  The patient is alert, oriented x3.  Good fund of knowledge.  Good use of language.  Recent and remote memory, language, fund of knowledge are all adequate.   Euthymic mood congruent affect  Speech normal rate/rhythm linear tp no loose asso,The patient does not have any active suicidal or homicidal ideation.  Does not have any auditory or visual hallucination.  Fair insight/judgment At this time, the patient was stable to be discharged.        Pt was not determined to not be a danger to himself or others. At the current time of discharge, the patient does not meet criteria for involuntary hospitalization. On the day of discharge, the patient reports that they do not have suicidal or homicidal ideation and would never hurt themselves or others. Steps taken to minimize risk include: assessing patient s behavior and thought process daily during hospital stay, discharging patient with adequate plan for follow up for mental and physical health and discussing safety plan of returning to the hospital should the patient ever have thoughts of harming themselves or others. Therefore, based on all available evidence including the factors cited above, the patient does not appear to be at imminent risk for self-harm, and is appropriate for outpatient level of care.     Educated about side effects/risk vs benefits /alternative including non treatment.Pt consented to be on medication.     .Total time spent on discharge summary more than 35 min  More than  20 min  planning, coordination of care, medication reconciliation and performance of physical exam on day of discharge.Care was coordinated with unit RN and unit therapist       Portillo Queen   Home Medication Instructions JUANA:[id number]    Printed on:03/05/21 1033   Medication Information                      atorvastatin (LIPITOR) 10 MG tablet  Take 1 tablet (10 mg) by mouth daily             FLUoxetine (PROZAC) 40 MG capsule  Take 1 capsule (40 mg) by mouth daily             gabapentin (NEURONTIN) 300 MG capsule  Take 1 capsule (300 mg) by mouth 3 times daily as needed (anxiety)             lisinopril (ZESTRIL) 30 MG tablet  Take  "1 tablet (30 mg) by mouth daily             metFORMIN (GLUCOPHAGE-XR) 500 MG 24 hr tablet  Take 1 tablet (500 mg) by mouth daily (with breakfast)             multivitamin w/minerals (THERA-VIT-M) tablet  Take 1 tablet by mouth daily             PHENobarbital (LUMINAL) 32.4 MG tablet  Take 1 tablet (32.4 mg) by mouth 2 times daily On 3/5 take 1 tab bid x1 day, then3/6 take 1 tab at bedtime             thiamine (B-1) 100 MG tablet  Take 1 tablet (100 mg) by mouth daily             traZODone (DESYREL) 50 MG tablet  Take 1 tablet (50 mg) by mouth nightly as needed for sleep             vitamin D2 (ERGOCALCIFEROL) 68320 units (1250 mcg) capsule  Take 1 capsule (50,000 Units) by mouth once a week                  Disposition: [facility]odging plus     Facts about COVID19 at www.cdc.gov/COVID19 and www.[place].gov/covid19     Keeping hands clean is one of the most important steps we can take to avoid getting sick and spreading germs to others.  Please wash your hands frequently and lather with soap for at least 20 seconds!     Medical Follow-Up:Treatment Follow-Up:  [facility]  Admission: Friday March 5th at 12:45 pm  [address]th [place] [address]  [phone]     Medical Follow-Up:  Per Internal Medicine Maira Dixon PA-C:  Follow up with Primary Care Provider in 1-2 weeks for repeat CMP   Dr. Kim Nash [facility]   [address] [place], [place] [id number] [phone]  Your appointment is April 12, 2021 at 2:00PM            .        \"Much or all of the text in this note was generated through the use of Dragon Dictate voice to text software. Errors in spelling or words which appear to be out of contact are unintentional, may be present due having escaped editing\"     "

## 2021-03-05 NOTE — PROGRESS NOTES
Progress Note    This patient had a Comprehensive Substance Abuse assessment on 3/4/2021 completed by JANN Perla.  This patient was seen for a face to face update of the Comprehensive Substance Abuse assessment on 3/5/2021 by JANN Ponce.  INSIDE: The patient's Comprehensive Substance Abuse assessment completed on 3/4/2021 is in the patient's electronic medical record in Epic in the Chart Review section under the Notes/Trans Tab.    Alcohol/Drug use since the last CD evaluation (include date of last use):     No additional substances use since the last CD evaluation     Please note any other clinical changes since the last CD evaluation (such as medication changes, additional legal charges, detoxification admissions, overdoses, etc.)     No significant changes since the last CD evaluation       ASAM Dimensions Original scores Current Scores   I.) Intoxication and Withdrawal: 1 1   II.) Biomedical:  1 1   III.) Emotional and Behavioral:  2 2   IV.) Readiness to Change:  1 1   V.) Relapse Potential: 4 4   VI.) Recovery Environmental: 4 4     Please list clinical justifications for the above ASAM score changes since the original comprehensive assessment:     None of the ASAM scores on the six dimensions had changed since the Comprehensive Substance Abuse assessment was completed on 3/4/2021.       Current AV: Current UA:     No AV as the patient was a direct transfer from 3 A IP detoxification unit at [facility] in [place], [place].     No UA screen as the patient was a direct transfer from  A IP detoxification unit at [facility] in [place], [place].        PHQ-9, SHERIN-7   PHQ-9 on 3/5/2021 SHERIN-7 on 3/5/2021   The patient's PHQ-9 score was 17 out of 27, indicating moderately severe depression.   The patient's SHERIN-7 score was 19 out of 21, indicating severe anxiety.       [place]-Suicide Severity Rating Scale Reassessment   Have you ever wished you were dead or that you could go to  sleep and not wake up?  Past Month:  YES       Have you actually had any thoughts of killing yourself?  Past Month:  No     Have you been thinking about how you might do this?     Past Month:  No   Lifetime:  No   Have you had these thoughts and had some intention of acting on them?     Past Month:  No   Lifetime:  No   Have you started to work out the details of how to kill yourself?   Past Month:  No   Lifetime:  No   Do you intend to carry out this plan?   No     When you have the thoughts how long do they last?  Fleeting - few seconds or minutes     Are there things - anyone or anything (i.e. family, Evangelical, pain of death) that stopped you from wanting to die or acting on thoughts of suicide?  Does not apply       2008  [employer]  Used with permission by Norma Yu, PhD.       Guide to C-SSRS Risk Ratings   NO IDEATION:  with no active thoughts IDEATION: with a wish to die. IDEATION: with active thoughts. Risk Ratings   If Yes No No 0 - Very Low Risk   If NA Yes No 1 - Low Risk   If NA Yes Yes 2 - Low/moderate risk   IDEATION: associated thoughts of methods without intent or plan INTENT: Intent to follow through on suicide PLAN: Plan to follow through on suicide Risk Ratings cont...   If Yes No No 3 - Moderate Risk   If Yes Yes No 4 - High Risk   If Yes Yes Yes 5 - High Risk   The patient's ADDITIONAL RISK FACTORS and lack of PROTECTIVE FACTORS may increase their overall suicide risk ratings.     Additional Risk Factors:    Significant history of having untreated or poorly treated mental health symptoms   Protective Factors:    Having people in his/her life that would prevent the patient from considering a suicide attempt (i.e. young children, spouse, parents, etc.)     Having pet(s) that give companionship and/or would prevent the patient from considering a suicide attempt     An absence of chronic health problems or stable and well treated chronic health issues     A  "positive relationship with his/her clinical medical and/or mental health providers     Having easy access to supportive family members     Having a good community support network     Risk Status   0. - Very Low Risk:  Evaluation Counselors:  Document in Epic / SBAR to counselor \"Very Low Risk\".      Treatment Counselors:  Reassess upon admission as applicable, assess weekly in progress notes under Dimension 3 and summarize in Discharge / Treatment summary under Dimension 3.     Additional information to support suicide risk rating: There was no additional information to provide at this time.      "

## 2021-03-06 ENCOUNTER — HOSPITAL ENCOUNTER (OUTPATIENT)
Dept: BEHAVIORAL HEALTH | Facility: CLINIC | Age: 26
End: 2021-03-06
Attending: FAMILY MEDICINE
Payer: COMMERCIAL

## 2021-03-06 VITALS — TEMPERATURE: 97.6 F | OXYGEN SATURATION: 97 %

## 2021-03-06 PROCEDURE — H2035 A/D TX PROGRAM, PER HOUR: HCPCS | Mod: HQ

## 2021-03-06 PROCEDURE — 1002N00001 HC LODGING PLUS FACILITY CHARGE ADULT

## 2021-03-06 ASSESSMENT — ANXIETY QUESTIONNAIRES: GAD7 TOTAL SCORE: 19
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
## 2021-03-06 NOTE — GROUP NOTE
Group Therapy Documentation    PATIENT'S NAME: Portillo Queen  MRN:   [id number]  :   1995  ACCT. NUMBER: [id number]  DATE OF SERVICE: 3/06/21  START TIME: 12:30 PM  END TIME:  2:30 PM  FACILITATOR(S): Paris Palmer; Skylar Ramirez LADC  TOPIC: BEH Group Therapy  Number of patients attending the group: 7  Group Length:  2 Hours    Group Therapy Type: Recovery strategies    Summary of Group / Topics Discussed:    Recovery Principles, Disease of addiction, and Relapse prevention      Group Attendance:  Attended group session    Patient's response to the group topic/interactions:  cooperative with task and discussed personal experience with topic    Patient appeared to be Attentive and Engaged.        Client specific details:  Patient participated in relapse prevention workshop and completed activities. Patient was engaged and shared  voice of addiction.

## 2021-03-06 NOTE — GROUP NOTE
Group Therapy Documentation    PATIENT'S NAME: Portillo Queen  MRN:   [id number]  :   1995  ACCT. NUMBER: [id number]  DATE OF SERVICE: 3/06/21  START TIME:  9:00 AM  END TIME: 11:00 AM  FACILITATOR(S): Lata Hooker LADC  TOPIC: BEH Group Therapy  Number of patients attending the group:  7  Group Length:  2 Hours    Group Therapy Type: Recovery strategies    Summary of Group / Topics Discussed:    Relapse prevention      Group Attendance:  Attended group session    Patient's response to the goup topic/interactions:  cooperative with task    Patient appeared to be Engaged.        Client specific details:  Portillo participated in a workshop on mental health and relapse prevention. Portillo participated in a group discussion on generalized anxiety and substance use disorders.

## 2021-03-06 NOTE — PROGRESS NOTES
"Patient:  Portillo Queen    Date: March 6, 2021    Comprehensive Assessment UPDATE       Comprehensive Summary Update and Review  Counselor met with patient on 3/06/2021 and reviewed the Comprehensive Assessment.    The following updates have been made:Other:  DIMENSION 6: Risk rating decreased from a \"4\" to a \"3\" based on the patient reporting his current housing situation is stable.  "

## 2021-03-07 ENCOUNTER — HOSPITAL ENCOUNTER (OUTPATIENT)
Dept: BEHAVIORAL HEALTH | Facility: CLINIC | Age: 26
End: 2021-03-07
Attending: FAMILY MEDICINE
Payer: COMMERCIAL

## 2021-03-07 PROCEDURE — H2035 A/D TX PROGRAM, PER HOUR: HCPCS | Mod: HQ

## 2021-03-07 PROCEDURE — 1002N00001 HC LODGING PLUS FACILITY CHARGE ADULT

## 2021-03-07 NOTE — GROUP NOTE
Group Therapy Documentation    PATIENT'S NAME: Portillo Queen  MRN:   [id number]  :   1995  ACCT. NUMBER: [id number]  DATE OF SERVICE: 3/07/21  START TIME: 12:30 PM  END TIME:  1:30 PM  FACILITATOR(S): Skylar Ramirez LADC; Paris Palmer  TOPIC: BEH Group Therapy  Number of patients attending the group:  7    Group Length:  1 Hours    Group Therapy Type: Recovery strategies    Summary of Group / Topics Discussed:    Tobacco Cessation      Group Attendance:  Attended group session    Patient's response to the group topic/interactions:  cooperative with task    Patient appeared to be Actively participating, Attentive and Engaged.        Client specific details:  Patient attended and participated in the tobacco cessation presentation. Patient seemed receptive to information and education.

## 2021-03-07 NOTE — GROUP NOTE
Group Therapy Documentation    PATIENT'S NAME: Portillo Queen  MRN:   [id number]  :   1995  ACCT. NUMBER: [id number]  DATE OF SERVICE: 3/07/21  START TIME:  8:30 AM  END TIME: 10:30 AM  FACILITATOR(S): Paris Palmer  TOPIC: BEH Group Therapy  Number of patients attending the group:  28  Group Length:  2 Hours    Group Therapy Type: Recovery strategies, Daily living/independence skills, and Health and wellbeing     Summary of Group / Topics Discussed:    Self-care activities      Group Attendance:  Attended group session    Patient's response to the group topic/interactions:  cooperative with task    Patient appeared to be Actively participating, Attentive and Engaged.        Client specific details:  Patient was attentive and participative during lecture.

## 2021-03-08 ENCOUNTER — HOSPITAL ENCOUNTER (OUTPATIENT)
Dept: BEHAVIORAL HEALTH | Facility: CLINIC | Age: 26
End: 2021-03-08
Attending: FAMILY MEDICINE
Payer: COMMERCIAL

## 2021-03-08 VITALS — OXYGEN SATURATION: 98 % | TEMPERATURE: 97.2 F

## 2021-03-08 VITALS — OXYGEN SATURATION: 97 % | TEMPERATURE: 97.3 F

## 2021-03-08 PROBLEM — F19.20 CHEMICAL DEPENDENCY (H): Status: ACTIVE | Noted: 2021-03-08

## 2021-03-08 PROCEDURE — 1002N00001 HC LODGING PLUS FACILITY CHARGE ADULT

## 2021-03-08 PROCEDURE — H2035 A/D TX PROGRAM, PER HOUR: HCPCS | Mod: HQ

## 2021-03-08 NOTE — PROGRESS NOTES
Name: Portillo Queen  Date: 3/8/2021  Medical Record: [id number]    Envelope Number: [id number] Hazardous Waste Bin (Med Room)    List of Contents (List each item separately in new row):   One Tablet of Phenobarbital 32.4mg (Destroyed Per LPRN)     Admission:  I am responsible for any personal items that are not sent to the safe or pharmacy.  [facility] is not responsible for loss, theft or damage of any property in my possession.      Patient Signature:  ___________________________________________       Date/Time:__________________________    Staff Signature: __________________________________       Date/Time:__________________________    [facility] Staff person, if patient is unable/unwilling to sign:      __________________________________________________________       Date/Time: __________________________      Discharge:  [facility] has returned all of my personal belongings:    Patient Signature: ________________________________________     Date/Time: ____________________________________    Staff Signature: ______________________________________     Date/Time:_____________________________________

## 2021-03-08 NOTE — PROGRESS NOTES
[facility]  Adult Chemical Dependency Program  Treatment Plan Requirements    These services are provided by the facility for each patient/client according to the individual's treatment plan:    Individual and group counseling    Education    Transition services    Services to address any co-occurring mental illness    Service coordination    Initial Treatment Plan Goals:  1. Complete all the requirements of Program Orientation.  2. Maintain medication compliance throughout the program.  3. Complete requirements for workshop/skills groups based on identified issues on your problem list.  4. Complete the support group attendance feedback sheet weekly.  5. Gain family involvement in treatment process to address family issues from the problem list.  6. Attend and participate in all required groups per individual treatment plan.  7. Focus attention to individualized issues from the treatment plan.  8. Complete all requirements for UA's, alcohol screening tests and other testing.  9. Schedule a physical examination if recommended.    In addition to the above, complete all individual goals as specifically outlines on your treatment plan.    Criteria for discharge:  Patients/clients are discharged from the program following completion of the entire program including Phase I and II or acceptance of other post-treatment referrals such as nursing home house, or aftercare at other facilities.  Patients/clients may also be discharged for inappropriate behavior or chemical use.      Favorable Discharge - Patients/clients have completed agreed upon treatment goals, understand their diagnosis and appear motivated about the follow-up care.    Guarded Discharge - Patients/clients have demonstrated some understanding of their diagnosis and recovery process, and have completed some of their treatment goals.  This prognosis also includes patients/clients who have completed some treatment goals but have not made  commitment to community support or follow through with referrals.    Unfavorable Discharge - Patients/clients have not completed agreed upon treatment goals due to their own choice, have limited understanding of their diagnosis, and have shown minimal or inconsistent behavior conducive to recovery.  Those patients/clients discharged due to behavioral problems will also be unfavorable discharges.                                  Adult CD Treatment Plan     Portillo Queen [id number]   1995 25 year old male    Treatment amount, frequency and duration:    A total of 30 hours of therapeutic programming will be completed weekly.    A total of 7 hours of peer-led recovery group attendance will be completed weekly.    Treatment schedule to be followed weekly for duration of treatment:    2 sessions of 2-hour long group therapy each day Monday through Saturday for duration of treatment  1 session of 1-hour group therapy each Sunday for duration of treatment  3 1-hour sessions of skills development Tuesday, Wednesday and Thursday weekly for duration of treatment  1 2-hour session of skills development Sunday  1 half-hour individual session with BAILEE Counselor 1 time weekly for duration of treatment.    1 hour of peer-led recovery meetings each night, Monday through Sunday, for duration of treatment.        Acute Intoxication/Withdrawal Potential     DIMENSION 1  RISK FACTOR: 1             Date   Source Problem/Goal/  Intervention Target  Date Initials Out  come Complete  Date   3/8/21 Self,  Assess-  Current  Problem:  Patient reports his last use date was on 3/01/2021. Patient completed a phenobarbital taper while admitted to [address] [facility] detox. Patient is able to manage mild withdrawal symptoms and participate in programming.        Goal: Be able to manage mild to moderate withdrawal symptoms.    Intervention:   1.Report to counselor and group any alcohol or drug use.   2. Report to nurse any increase in withdrawal symptoms.   "4/2/21 KK EFF 4/01/21       Biomedical Conditions and Complaints     DIMENSION 2  RISK FACTOR: 1             Date   Source Problem/Goal/  Intervention Target  Date Initials Out  come Complete  Date   3/8/21 Self,  History-  Current  Problem:  Patient reports a medical diagnosis of hypertension and high cholesterol. Patient is followed by Dr. Guardado at [facility].     Goal: Follow recommendations of medical provider.    Intervention:     1.Continue to take prescribed medications and follow-up with medical interventions as needed. 4/2/21 KK EFF 4/01/21       Emotional/Behavioral/Cognitive Conditions and Complications     DIMENSION 3  RISK FACTOR:   2        Date   Source Problem/Goal/  Intervention Target  Date Initials Out  come Complete  Date   3/8/21 Self, History  Current    Problem: Patient reports a mental health diagnosis of major depression, generalized anxiety, ADHD, and PTSD.     Goal: Stabilize and maintain mental and emotional health    Intervention:    1. Patient suicide risk on admission was \"No Identified Risk\".  Complete safety plan with counselor.     2. Complete the \"Recognizing Anxiety\" packet.    3. Meet with staff mental health therapist for individual therapy.     4. Practice 5 minutes of breathing/meditation twice daily.                3/8/21    3/13/21    Weekly      Daily   KK               EFF    EFF    EFF      EFF               3/6/21    3/15/21    4/01/21      4/01/21   3/8/21 Self, History  Current Problem: Patient reports a distorted sense of self related to addiction and related behaviors.    Goal: Learn about emotional intelligence and verbalize awareness of feelings.     Intervention:  1. Complete the \"Step One\" packet.     2. \"Book of Me' assignment.                3/12/21    3/9/21 KK               EFF    EFF               3/19/21    3/12/21          Readiness to Change     DIMENSION 4  RISK FACTOR: 1           Date   Source Problem/Goal/  Intervention Target  Date Initials " "Out  come Complete  Date   3/8/21 Assess  Self  Current Problem: Reports many attempts at sobriety without treatment, but these have resulted in relapse.      Goal: Verbalize the powerlessness that result from addiction and relapse.     Intervention:   1.Complete \"Drug Use History  assignment share in group.               3/18/21   KK               EFF               3/18/21   3/8/21 Self,  HistoryCurrent Problem: External reinforcement for change due to living environment, legals, social pressure.    Goal: Increase internal motivation for sobriety.    Intervention:   1.Complete group project: Individual collage, what life will look in 5 years sober versus 5 years of continued use.              3/25/21 KK             EF             3/18/21       Relapse/Continued Use/Continued Problem Potential     DIMENSION 5  RISK FACTOR:  4               Date   Source Problem/Goal/  Intervention Target  Date Initials Out  come Complete  Date   3/8/21 Self, HistoryCurrent Problem: Patient lacks insight into personal relapse process, triggers, warnings signs and lacks coping skills.    Goal: Gain insight about personal relapse process, triggers, warning signs and develop coping skills to prevent relapse    Intervention:   1.Attend relapse prevention workshops    2. Complete \"Relapse Triggers and Cues\" assignment.                 3/6/21  3/13/21    3/30/21 KK                 EFF  EFF    EFF                 3/6/21  3/13/21    3/26/21   3/8/21 Self,  HistoryCurrent Problem: Patient reports guilt and shame for past using behaviors and resentment toward self.    Goal: Begin the process of self-forgiveness.     Intervention:   1. Complete the \"Recovery Check\" handout.    2. Attend Spirituality group on Wednesdays.              3/21/21    Wed   Group KK             EFF    EFF             3/29/21    3/31/21   3/8/21 Self Problem: Patient reports unprocessed negative emotions contribute to engaging in maladaptive behaviors.     Goal: Begin " "to process uncomfortable emotions and verbalize coping strategies.     Intervention:   1. Read \"Handling Grief as a Man.\" Journal your thoughts and how you relate to the article.                   3/15/21 KK                 EFF                 3/24/21   3/8/21 Self, HistoryCurrent Problem: Patient is not currently connected to the recovery community.     Goal: Identify and make changes in social relationships that will support recovery.     Intervention:   1. \"People Who Influence Your Recovery\" assignment.     3. Attend @ least 3 zoom 12-step meetings per week while in [place].             In group    Weekly KK             EFF      EFF             3/26/21      3/31/21       Recovery Environment     DIMENSION 6  RISK FACTOR: 3           Date   Source Problem/Goal/  Intervention Target  Date Initials Out  come Complete  Date   3/8/21 Self, HistoryCurrent  Problem: Friends or family members engage in addictive behavior.     Goal: Identify problems with relationships that may hinder recovery efforts.    Intervention:    1. Read \"How to Create Healthy Boundaries\" and  \"Setting Boundaries\" packet. Share in group    3. Participate in weekend \"Relationship workshop\" and complete all activities.               3/25/21      3/20/21  3/27/21 KK               EFF      EFF               3/25/21      3/20/21  3/27/21   3/8/21 Self, HistoryCurrent   Problem: Unstructured free time due and hobbies revolve around substance use.     And has few sober/meaningful activities for free time    Goal:  Create a structured daily schedule that includes activities for sober fun.     Intervention:  1. \"Goal Setting.\" and \"Boredom Busters\" worksheet.                   3/10/21 KK                   EFF                   3/31/21   3/8/21 Self, HistoryCurrent   Problem: Living environment may not conducive to sobriety.     Goal: Secure safe sober housing.     Intervention:    1. Discuss with counselor sober living options make necessary arrangements. " "    2. Read the pamphlet titled \"Free to Care.\"  4/1/21                 K             NA      EFF                   3/10/21     All interventions that are designated as \"current\" will need to be completed in order to transition out of treatment with a favorable prognosis. The treatment plan is a fluid document and a work in progress. Interventions and goals may be added at any time to customize the plan to each individual's needs. Patients may work with counselors to change interventions as long as they pertain to the goals stipulated in the plan and/or are clinically driven.  Individual abuse prevention plan (required for lodging plus) : specific actions, referral:   No additional protection measures required other than the Program Abuse Prevention Plan -         Acknowledgement of Current Treatment Plan - Initial Treatment Plan     INITIAL TREATMENT PLAN:     1. I have participated in creating my treatment plan with my therapist / counselor on 3/8/21  I agree with the plan as it is written in the electronic health record.    Name Signature/Date   Portillo Queen    Name of Therapist / Counselor Signature/Date   Lata Hooker [facility]      2. I have completed and reviewed my Safety Plan with my counselor and signed this on 3/8/21.I have been given the hard copy of this plan.    Patient signature/date:      ________________________________________________________________    3. Last Use Date: 3/01/2021    Patient signature/date:     ________________________________________________________________    "

## 2021-03-08 NOTE — PROGRESS NOTES
Pt was on a taper of phenobarbital his last dose was supposed to be on 3/7. Pt fell asleep without taking his last pill. Pt was ok with medication being destroyed, pt reports no signs or symptoms of benzodiazapine withdrawal. He verbalized understanding that he should come to staff RN if he starts to experience and benzo withdrawl symptoms.

## 2021-03-08 NOTE — GROUP NOTE
Group Therapy Documentation    PATIENT'S NAME: Portillo Queen  MRN:   [id number]  :   1995  ACCT. NUMBER: [id number]  DATE OF SERVICE: 3/08/21  START TIME: 12:30 PM  END TIME:  2:30 PM  FACILITATOR(S): Shirley Nicole LADC  TOPIC: BEH Group Therapy  Number of patients attending the group: 7  Group Length:  2 Hours    Group Therapy Type: Emotion processing    Summary of Group / Topics Discussed:    Sober coping skills, Disease of addiction, and Emotions/expression      Group Attendance:  Attended group session    Patient's response to the group topic/interactions:  cooperative with task    Patient appeared to be Actively participating, Attentive and Engaged.        Client specific details: Portillo was an active participant in afternoon group therapy session.  He gave appropriate feedback to his peers who shared treatment plan assignments.

## 2021-03-08 NOTE — GROUP NOTE
Group Therapy Documentation    PATIENT'S NAME: Portillo Queen  MRN:   [id number]  :   1995  ACCT. NUMBER: [id number]  DATE OF SERVICE: 3/08/21  START TIME:  9:00 AM  END TIME: 11:00 AM  FACILITATOR(S): Lata Hooker LADC  TOPIC: BEH Group Therapy  Number of patients attending the group:  7  Group Length:  2 Hours    Group Therapy Type: Recovery strategies    Summary of Group / Topics Discussed:    Recovery Principles and Disease of addiction      Group Attendance:  Attended group session    Patient's response to the group topic/interactions:  cooperative with task    Patient appeared to be Engaged.        Client specific details:  Portillo participated in his first group therapy session and peer introductions. Portillo worked to identify his weekly goal of beginning a workout routine while in treatment.

## 2021-03-08 NOTE — PROGRESS NOTES
Name: Portillo Queen  Date: 3/8/2021  Medical Record: [id number]  Envelope Number: [id number]  List of Contents (List each item separately in new row):   Cell phone; wireless  w/ port-- stored in room F678  Admission:  I am responsible for any personal items that are not sent to the safe or pharmacy.  [facility] is not responsible for loss, theft or damage of any property in my possession.  Patient Signature:  ___________________________________________       Date/Time:__________________________    Staff Signature: __________________________________       Date/Time:__________________________    [facility] Staff person, if patient is unable/unwilling to sign:      __________________________________________________________       Date/Time: __________________________  Discharge:  [facility] has returned all of my personal belongings:    Patient Signature: ________________________________________     Date/Time: ____________________________________    Staff Signature: ______________________________________     Date/Time:_____________________________________

## 2021-03-09 ENCOUNTER — HOSPITAL ENCOUNTER (OUTPATIENT)
Dept: BEHAVIORAL HEALTH | Facility: CLINIC | Age: 26
End: 2021-03-09
Attending: FAMILY MEDICINE
Payer: COMMERCIAL

## 2021-03-09 VITALS — OXYGEN SATURATION: 97 % | TEMPERATURE: 98 F

## 2021-03-09 PROCEDURE — H2035 A/D TX PROGRAM, PER HOUR: HCPCS | Mod: HQ

## 2021-03-09 PROCEDURE — 1002N00001 HC LODGING PLUS FACILITY CHARGE ADULT

## 2021-03-09 NOTE — GROUP NOTE
"Group Therapy Documentation    PATIENT'S NAME: Portillo Queen  MRN:   [id number]  :   1995  ACCT. NUMBER: [id number]  DATE OF SERVICE: 3/09/21  START TIME: 12:30 PM  END TIME:  2:30 PM  FACILITATOR(S): Shirley Nicole LADC  TOPIC: BEH Group Therapy  Number of patients attending the group: 7  Group Length:  2 Hours    Group Therapy Type: Emotion processing    Summary of Group / Topics Discussed:  Recovery Principles and Disease of addiction      Group Attendance:  Attended group session    Patient's response to the group topic/interactions:  cooperative with task    Patient appeared to be Actively participating, Attentive and Engaged.        Client specific details: Portillo participated in watching the documentary \"Unguarded\" and group discussion about how participants could relate to the main character's story of addiction and recovery.  "

## 2021-03-09 NOTE — GROUP NOTE
Group Therapy Documentation    PATIENT'S NAME: Portillo Queen  MRN:   [id number]  :   1995  ACCT. NUMBER: [id number]  DATE OF SERVICE: 3/09/21  START TIME:  9:00 AM  END TIME: 11:00 AM  FACILITATOR(S): Paris Palmer  TOPIC: BEH Group Therapy  Number of patients attending the group: 7  Group Length:  2 Hours    Group Therapy Type: Recovery strategies, Emotion processing, and Daily living/independence skills    Summary of Group / Topics Discussed:    Recovery Principles, Sober coping skills, Relationship/socialization, Balanced lifestyle, and Relapse prevention      Group Attendance:  Attended group session    Patient's response to the group topic/interactions:  Patient was very cooperative during group session.      Patient appeared to be Actively participating, Attentive and Engaged.        Client specific details:  Patient was attentive and participative during group session.

## 2021-03-09 NOTE — ADDENDUM NOTE
Encounter addended by: Shirley Nicole LADC on: 3/9/2021 8:10 AM   Actions taken: Charge Capture section accepted

## 2021-03-09 NOTE — GROUP NOTE
Group Therapy Documentation    PATIENT'S NAME: Portillo Queen  MRN:   [id number]  :   1995  ACCT. NUMBER: [id number]  DATE OF SERVICE: 3/09/21  START TIME:  3:00 PM  END TIME:  4:00 PM  FACILITATOR(S): Shirley Nicole LADC; Mariola Quinn; Paris Palmer; Belen Fregoso LADC  TOPIC: BEH Group Therapy  Number of patients attending the group: 23  Group Length:  1 Hours    Group Therapy Type: Health and wellbeing     Summary of Group / Topics Discussed:    Balanced lifestyle and Self-care activities      Group Attendance:  Attended group session    Patient's response to the group topic/interactions:  cooperative with task    Patient appeared to be Attentive and Engaged.        Client specific details: Portillo participated in Skills Group on Nutrition presented by Dr. Mariola Quinn.

## 2021-03-10 ENCOUNTER — HOSPITAL ENCOUNTER (OUTPATIENT)
Dept: BEHAVIORAL HEALTH | Facility: CLINIC | Age: 26
End: 2021-03-10
Attending: FAMILY MEDICINE
Payer: COMMERCIAL

## 2021-03-10 VITALS — OXYGEN SATURATION: 95 % | TEMPERATURE: 96.1 F

## 2021-03-10 PROCEDURE — H2035 A/D TX PROGRAM, PER HOUR: HCPCS | Mod: HQ

## 2021-03-10 PROCEDURE — 1002N00001 HC LODGING PLUS FACILITY CHARGE ADULT

## 2021-03-10 PROCEDURE — H2035 A/D TX PROGRAM, PER HOUR: HCPCS

## 2021-03-10 NOTE — GROUP NOTE
Group Therapy Documentation    PATIENT'S NAME: Portillo Queen  MRN:   [id number]  :   1995  ACCT. NUMBER: [id number]  DATE OF SERVICE: 3/10/21  START TIME:  8:30 AM  END TIME:  9:30 AM  FACILITATOR(S): Rene King LADC  TOPIC: BEH Group Therapy  Number of patients attending the group:  7  Group Length:  1 Hours    Group Therapy Type: Recovery strategies    Summary of Group / Topics Discussed:    Recovery Principles and Disease of addiction      Group Attendance:  Attended group session    Patient's response to the group topic/interactions:  cooperative with task    Patient appeared to be Attentive.        Client specific details:  Portillo gave appropriate feedback..

## 2021-03-10 NOTE — GROUP NOTE
Group Therapy Documentation    PATIENT'S NAME: Portillo Queen  MRN:   [id number]  :   1995  ACCT. NUMBER: [id number]  DATE OF SERVICE: 3/10/21  START TIME: 10:00 AM  END TIME: 11:30 AM  FACILITATOR(S): Lata Hooker LADC  TOPIC: BEH Group Therapy  Number of patients attending the group:  6  Group Length:  2 Hours    Group Therapy Type: Recovery strategies    Summary of Group / Topics Discussed:    Recovery Principles and Mindfulness/Relaxation      Group Attendance:  Attended group session    Patient's response to the group topic/interactions:  cooperative with task    Patient appeared to be Attentive.        Client specific details:  Portillo appeared engaged during a peer's drug use history presentation and provided supportive feedback. Portillo participated in a group mindfulness exercise and spoke of increasing his emotional awareness.

## 2021-03-10 NOTE — GROUP NOTE
Group Therapy Documentation    PATIENT'S NAME: Portillo Queen  MRN:   [id number]  :   1995  ACCT. NUMBER: [id number]  DATE OF SERVICE: 3/10/21  START TIME: 12:30 PM  END TIME:  2:30 PM  FACILITATOR(S): Paris Palmer  TOPIC: BEH Group Therapy  Number of patients attending the group:  7  Group Length:  2 Hours    Group Therapy Type: Recovery strategies, Daily living/independence skills, and Health and wellbeing     Summary of Group / Topics Discussed:    Spiritual Care      Group Attendance:  Attended group session    Patient's response to the group topic/interactions:  cooperative with task    Patient appeared to be Actively participating, Attentive and Engaged.        Client specific details:  Patient was attentive and participative during group session.

## 2021-03-11 ENCOUNTER — HOSPITAL ENCOUNTER (OUTPATIENT)
Dept: BEHAVIORAL HEALTH | Facility: CLINIC | Age: 26
End: 2021-03-11
Attending: FAMILY MEDICINE
Payer: COMMERCIAL

## 2021-03-11 VITALS — OXYGEN SATURATION: 95 % | TEMPERATURE: 99.2 F

## 2021-03-11 PROCEDURE — H2035 A/D TX PROGRAM, PER HOUR: HCPCS | Mod: HQ

## 2021-03-11 PROCEDURE — 1002N00001 HC LODGING PLUS FACILITY CHARGE ADULT

## 2021-03-11 NOTE — GROUP NOTE
"Group Therapy Documentation    PATIENT'S NAME: Portillo Queen  MRN:   [id number]  :   1995  ACCT. NUMBER: [id number]  DATE OF SERVICE: 3/11/21  START TIME: 12:30 PM  END TIME:  2:30 PM  FACILITATOR(S): Lata Hooker LADC  TOPIC: BEH Group Therapy  Number of patients attending the group:  7  Group Length:  2 Hours    Group Therapy Type: Recovery strategies    Summary of Group / Topics Discussed:    Mindfulness/Relaxation and Emotions/expression      Group Attendance:  Attended group session    Patient's response to the group topic/interactions:  cooperative with task    Patient appeared to be Engaged.        Client specific details:  Portillo participated in the \"5 and 5\" activity and created a collage of what his life will look like five years sober versus five years using. Portillo also engaged in a meditation exercise and discussion on distress tolerance.  "

## 2021-03-11 NOTE — GROUP NOTE
Psychoeducation Group Documentation    PATIENT'S NAME: Portillo Queen  MRN:   [id number]  :   1995  ACCT. NUMBER: [id number]  DATE OF SERVICE: 3/11/21  START TIME:  3:00 PM  END TIME:  4:00 PM  FACILITATOR(S): Michael Key LADC; Paris Palmer; Belen Fregoso ADC-T  TOPIC: BEH Pyschoeducation  Number of patients attending the group: 7  Group Length:  1 Hours    Skills Group Therapy Type: Daily living/independence skills and Healthy behaviors development    Summary of Group / Topics Discussed:    Balanced lifestyle skills and Mindfulness/Relaxation skills    Group Attendance:  Attended group session    Patient's response to the group topic/interactions:  cooperative with task    Patient appeared to be Attentive and Engaged.         Client specific details: Pt participated in a mindfulness workshop, paid attention to lecturer and completed activity.

## 2021-03-11 NOTE — GROUP NOTE
Group Therapy Documentation    PATIENT'S NAME: Portillo Queen  MRN:   [id number]  :   1995  ACCT. NUMBER: [id number]  DATE OF SERVICE: 3/11/21  START TIME:  9:00 AM  END TIME: 11:00 AM  FACILITATOR(S): Paris Palmer  TOPIC: BEH Group Therapy  Number of patients attending the group:  7  Group Length:  2 Hours    Group Therapy Type: Recovery strategies, Emotion processing, and Daily living/independence skills    Summary of Group / Topics Discussed:    Sober coping skills, Relationship/socialization, Balanced lifestyle, Relapse prevention, and Self-care activities      Group Attendance:  Attended group session    Patient's response to the group topic/interactions:  cooperative with task    Patient appeared to be Actively participating, Attentive and Engaged.        Client specific details:  Patient ws attentive and participative during group session.

## 2021-03-12 ENCOUNTER — HOSPITAL ENCOUNTER (OUTPATIENT)
Dept: BEHAVIORAL HEALTH | Facility: CLINIC | Age: 26
End: 2021-03-12
Attending: FAMILY MEDICINE
Payer: COMMERCIAL

## 2021-03-12 VITALS — TEMPERATURE: 97.7 F | OXYGEN SATURATION: 97 %

## 2021-03-12 PROCEDURE — H2035 A/D TX PROGRAM, PER HOUR: HCPCS | Mod: HQ

## 2021-03-12 PROCEDURE — 1002N00001 HC LODGING PLUS FACILITY CHARGE ADULT

## 2021-03-12 NOTE — GROUP NOTE
Group Therapy Documentation    PATIENT'S NAME: Portillo Queen  MRN:   [id number]  :   1995  ACCT. NUMBER: [id number]  DATE OF SERVICE: 3/12/21  START TIME: 12:30 PM  END TIME:  2:30 PM  FACILITATOR(S): Lata Hooker LADC; Michael Key LADC; Arsalan Carney  TOPIC: BEH Group Therapy  Number of patients attending the group:  7  Group Length:  2 Hours    Group Therapy Type: Recovery strategies    Summary of Group / Topics Discussed:    Leisure explorations/use of leisure time      Group Attendance:  Attended group session    Patient's response to the group topic/interactions:  cooperative with task    Patient appeared to be Attentive.        Client specific details:  Portillo participated in a group leisure activity and discussion on substance use disorders.

## 2021-03-12 NOTE — ADDENDUM NOTE
Encounter addended by: Lata Hooker LADC on: 3/12/2021 8:38 AM   Actions taken: Charge Capture section accepted

## 2021-03-12 NOTE — GROUP NOTE
"Group Therapy Documentation    PATIENT'S NAME: Portillo Queen  MRN:   [id number]  :   1995  ACCT. NUMBER: [id number]  DATE OF SERVICE: 3/12/21  START TIME:  9:00 AM  END TIME: 11:00 AM  FACILITATOR(S): Paris Palmer  TOPIC: BEH Group Therapy  Number of patients attending the group: 7  Group Length:  2 Hours    Group Therapy Type: Recovery strategies, Emotion processing, and Daily living/independence skills    Summary of Group / Topics Discussed:    Recovery Principles, Relationship/socialization, Balanced lifestyle, Emotions/expression, Relapse prevention, and Self-care activities      Group Attendance:  Attended group session    Patient's response to the group topic/interactions:  cooperative with task    Patient appeared to be Actively participating, Attentive and Engaged.        Client specific details:  Patient completed his \"Book of Me\" assignment and presented in group.  "

## 2021-03-12 NOTE — PROGRESS NOTES
Patient reported interest in attending evening outpatient as part of his aftercare plan. Patient is also considering sober housing as an alternative to going back to his parents home. Patient was given information on [facility] and the [facility] list for review. Patient will follow-up with counseling staff about contacting referrals.

## 2021-03-13 ENCOUNTER — HOSPITAL ENCOUNTER (OUTPATIENT)
Dept: BEHAVIORAL HEALTH | Facility: CLINIC | Age: 26
End: 2021-03-13
Attending: FAMILY MEDICINE
Payer: COMMERCIAL

## 2021-03-13 VITALS — TEMPERATURE: 97.6 F | OXYGEN SATURATION: 96 %

## 2021-03-13 PROCEDURE — H2035 A/D TX PROGRAM, PER HOUR: HCPCS | Mod: HQ

## 2021-03-13 PROCEDURE — 1002N00001 HC LODGING PLUS FACILITY CHARGE ADULT

## 2021-03-13 NOTE — GROUP NOTE
Group Therapy Documentation    PATIENT'S NAME: Portillo Queen  MRN:   [id number]  :   1995  ACCT. NUMBER: [id number]  DATE OF SERVICE: 3/13/21  START TIME: 12:30 PM  END TIME:  2:30 PM  FACILITATOR(S): Rene King LADC; Michael Key LADC  TOPIC: BEH Group Therapy  Number of patients attending the group:  7  Group Length:  2 Hours    Group Therapy Type: Recovery strategies    Summary of Group / Topics Discussed:    Trauma informed care and Relapse prevention      Group Attendance:  Attended group session    Patient's response to the group topic/interactions:  cooperative with task    Patient appeared to be Attentive.        Client specific details:  Portillo gave appropriate feedback..

## 2021-03-13 NOTE — GROUP NOTE
Psychoeducation Group Documentation    PATIENT'S NAME: Portillo Queen  MRN:   [id number]  :   1995  ACCT. NUMBER: [id number]  DATE OF SERVICE: 3/13/21  START TIME:  8:45 AM  END TIME: 10:30 AM  FACILITATOR(S): Rene King LADC; Belen Fregoso ADC-T  TOPIC: BEH Pyschoeducation  Number of patients attending the group: 7  Group Length:  2 Hours    Skills Group Therapy Type: Recovery skills    Summary of Group / Topics Discussed:    Balanced lifestyle skills and Relapse prevention skills    Group Attendance:  Attended group session    Patient's response to the group topic/interactions:  cooperative with task and listened actively    Patient appeared to be Attentive and Engaged.         Client specific details: Pt was attentive during a lecture on relapse prevention skills.

## 2021-03-14 ENCOUNTER — HOSPITAL ENCOUNTER (OUTPATIENT)
Dept: BEHAVIORAL HEALTH | Facility: CLINIC | Age: 26
End: 2021-03-14
Attending: FAMILY MEDICINE
Payer: COMMERCIAL

## 2021-03-14 VITALS — TEMPERATURE: 98 F | OXYGEN SATURATION: 97 %

## 2021-03-14 PROCEDURE — H2035 A/D TX PROGRAM, PER HOUR: HCPCS | Mod: HQ

## 2021-03-14 PROCEDURE — 1002N00001 HC LODGING PLUS FACILITY CHARGE ADULT

## 2021-03-14 NOTE — GROUP NOTE
Group Therapy Documentation    PATIENT'S NAME: Portillo Queen  MRN:   [id number]  :   1995  ACCT. NUMBER: [id number]  DATE OF SERVICE: 3/14/21  START TIME: 12:30 PM  END TIME:  1:30 PM  FACILITATOR(S): Belen Fregoso ADC-T  TOPIC: BEH Group Therapy  Number of patients attending the group: 7  Group Length:  1 Hours    Group Therapy Type: Recovery strategies    Summary of Group / Topics Discussed:    Sober coping skills and Relapse prevention    Group Attendance:  Attended group session    Patient's response to the group topic/interactions:  cooperative with task and listened actively    Patient appeared to be Actively participating, Attentive and Engaged.        Client specific details: Pt was attentive during a lecture about coping with stress as part of relapse prevention and participated in a group activity of brainstorming coping mechanisms.

## 2021-03-14 NOTE — GROUP NOTE
Group Therapy Documentation    PATIENT'S NAME: Portillo Queen  MRN:   [id number]  :   1995  ACCT. NUMBER: [id number]  DATE OF SERVICE: 3/14/21  START TIME:  8:40 AM  END TIME: 10:30 AM  FACILITATOR(S): Alexia Grimm, RN; Belen Fregoso ADC-T  TOPIC: BEH Group Therapy  Number of patients attending the group: 7  Group Length:  2 Hours    Group Therapy Type: Health and wellbeing     Summary of Group / Topics Discussed:    Balanced lifestyle and Self-care activities    Group Attendance:  Attended group session    Patient's response to the group topic/interactions:  cooperative with task and listened actively    Patient appeared to be Actively participating, Attentive and Engaged.        Client specific details: Pt was attentive during RN lecture on STDs and pregnancy and participated in a group activity.

## 2021-03-15 ENCOUNTER — HOSPITAL ENCOUNTER (OUTPATIENT)
Dept: BEHAVIORAL HEALTH | Facility: CLINIC | Age: 26
End: 2021-03-15
Attending: FAMILY MEDICINE
Payer: COMMERCIAL

## 2021-03-15 VITALS — OXYGEN SATURATION: 98 % | TEMPERATURE: 98 F

## 2021-03-15 PROCEDURE — H2035 A/D TX PROGRAM, PER HOUR: HCPCS | Mod: HQ

## 2021-03-15 PROCEDURE — 1002N00001 HC LODGING PLUS FACILITY CHARGE ADULT

## 2021-03-15 NOTE — GROUP NOTE
Group Therapy Documentation    PATIENT'S NAME: Portillo Queen  MRN:   [id number]  :   1995  ACCT. NUMBER: [id number]  DATE OF SERVICE: 3/15/21  START TIME: 12:30 PM  END TIME:  2:30 PM  FACILITATOR(S): Shirley Nicole LADC  TOPIC: BEH Group Therapy  Number of patients attending the group: 6  Group Length:  2 Hours    Group Therapy Type: Emotion processing    Summary of Group / Topics Discussed:    Recovery Principles and Sober coping skills      Group Attendance:  Attended group session    Patient's response to the group topic/interactions:  cooperative with task    Patient appeared to be Actively participating, Attentive and Engaged.        Client specific details: Portillo was an active participant in afternoon group therapy session.  He gave appropriate feedback to his peers who presented treatment plan assignments.

## 2021-03-16 ENCOUNTER — HOSPITAL ENCOUNTER (OUTPATIENT)
Dept: BEHAVIORAL HEALTH | Facility: CLINIC | Age: 26
End: 2021-03-16
Attending: FAMILY MEDICINE
Payer: COMMERCIAL

## 2021-03-16 VITALS — TEMPERATURE: 97.9 F | OXYGEN SATURATION: 98 %

## 2021-03-16 PROCEDURE — H2035 A/D TX PROGRAM, PER HOUR: HCPCS | Mod: HQ

## 2021-03-16 PROCEDURE — 1002N00001 HC LODGING PLUS FACILITY CHARGE ADULT

## 2021-03-16 NOTE — GROUP NOTE
Group Therapy Documentation    PATIENT'S NAME: Portillo Queen  MRN:   [id number]  :   1995  ACCT. NUMBER: [id number]  DATE OF SERVICE: 3/16/21  START TIME: 12:30 PM  END TIME:  2:30 PM  FACILITATOR(S): Radha Gonzales LADC; Belen Fregoso LADC  TOPIC: BEH Group Therapy  Number of patients attending the group: 6  Group Length:  2 hours    Group Therapy Type: Recovery strategies    Summary of Group / Topics Discussed:    Recovery Principles      Group Attendance:  Attended group session    Patient's response to the group topic/interactions:  cooperative with task    Patient appeared to be Actively participating, Attentive and Engaged.        Client specific details:  Portillo attended afternoon group therapy session.  He participated in a discussion on addiction as a disease after watching a movie on the subject titled Pleasure Unwoven.  Portillo also demonstrated support for another group member who presented his relapse prevention plan by providing positive feedback.

## 2021-03-16 NOTE — GROUP NOTE
Group Therapy Documentation    PATIENT'S NAME: Portillo Queen  MRN:   [id number]  :   1995  ACCT. NUMBER: [id number]  DATE OF SERVICE: 3/16/21  START TIME:  9:00 AM  END TIME: 11:00 AM  FACILITATOR(S): Lata Hooker LADC  TOPIC: BEH Group Therapy  Number of patients attending the group:  6  Group Length:  2 Hours    Group Therapy Type: Recovery strategies    Summary of Group / Topics Discussed:    Recovery Principles and Emotions/expression      Group Attendance:  Attended group session    Patient's response to the group topic/interactions:  cooperative with task    Patient appeared to be Engaged.        Client specific details:  Portillo provided supportive feedback and shared insight into the recovery behaviors he's noticed amongst peers. Portillo also engaged in a coping skills exercise for distress tolerance.

## 2021-03-16 NOTE — GROUP NOTE
Psychoeducation Group Documentation    PATIENT'S NAME: Portillo Queen  MRN:   [id number]  :   1995  ACCT. NUMBER: [id number]  DATE OF SERVICE: 3/16/21  START TIME:  3:00 PM  END TIME:  4:00 PM  FACILITATOR(S): Paris Palmer; Belen Fregoso ADC-T  TOPIC: BEH Pyschoeducation  Number of patients attending the group: 6  Group Length:  1 Hours    Skills Group Therapy Type: Recovery skills    Summary of Group / Topics Discussed:    Relapse prevention skills    Group Attendance:  Attended group session    Patient's response to the group topic/interactions:  cooperative with task and listened actively    Patient appeared to be Attentive and Engaged.         Client specific details: Pt was engaged and attentive during a presentation on the disease of addiction and a personal account of the toll drugs can take.

## 2021-03-17 ENCOUNTER — HOSPITAL ENCOUNTER (OUTPATIENT)
Dept: BEHAVIORAL HEALTH | Facility: CLINIC | Age: 26
End: 2021-03-17
Attending: FAMILY MEDICINE
Payer: COMMERCIAL

## 2021-03-17 VITALS — TEMPERATURE: 97.8 F | OXYGEN SATURATION: 97 %

## 2021-03-17 PROCEDURE — H2035 A/D TX PROGRAM, PER HOUR: HCPCS

## 2021-03-17 PROCEDURE — H2035 A/D TX PROGRAM, PER HOUR: HCPCS | Mod: HQ

## 2021-03-17 PROCEDURE — 1002N00001 HC LODGING PLUS FACILITY CHARGE ADULT

## 2021-03-17 NOTE — GROUP NOTE
Group Therapy Documentation    PATIENT'S NAME: Portillo Queen  MRN:   [id number]  :   1995  ACCT. NUMBER: [id number]  DATE OF SERVICE: 3/17/21  START TIME: 12:30 PM  END TIME:  2:30 PM  FACILITATOR(S): Lata Hooker LADC; Janet Del Rosario  TOPIC: BEH Group Therapy  Number of patients attending the group:  5  Group Length:  2 Hours    Group Therapy Type: Recovery strategies    Summary of Group / Topics Discussed:    Spiritual Care      Group Attendance:  Attended group session    Patient's response to the group topic/interactions:  cooperative with task    Patient appeared to be Engaged.        Client specific details:  Portillo was active in a group discussion on spirituality and finding meaning in a recovery lifestyle. .

## 2021-03-17 NOTE — PROGRESS NOTES
INDIVIDUAL SESSION SUMMARY    D) Met with client on 3/17/21 from 9:40-10:30am. Client is in the [facility]. Client shared that he is still considering his options for aftercare and better understands the importance of having a sober support network when he returns home. Client stated that he's open to sober living but is leaning towards returning home where he has his dog and more privacy. Client reported that he will be talking with his father and asking for his feedback this week. Client spoke of his passion for soccer and other sports going back to childhood and how he'd be interested in finding sober teams. Client discussed his interest in returning to school for a trade and using this time to save up for his own apartment. Client discussed unresolved regret and resentments towards his parents who did not support his idea of pursuing soccer as career. Client shared that he wished his family better understood addiction but that he has a few friends he trusts will be supportive. Client shared that he understands and has accepted that he also needs to give up his pot use.     I) Individual session with client. Provided client with verbal interventions including: validation, nurturing, compassion and support. Discussed the importance of recovery behaviors such as utilizing sponsorship, sober support network, going to 12-step meetings, and daily rituals. Discussed short and long-term goals; how client can use his skills as a  to help him in sobriety including: patience, strategy, and teamwork.     A) Client appears to be using healthy coping skills to help with managing difficult emotions and stress management. Client appears to desire a sober support network. Client appears to have growing internal motivation and would benefit from continuing support to help with stress management, emotional regulation, impulse control, relapse prevention, increasing resiliency and self-esteem.     P) Next  session is scheduled for 3/24/21.   Elaine Dinh, BUSHRA  3/17/2021

## 2021-03-17 NOTE — GROUP NOTE
Group Therapy Documentation    PATIENT'S NAME: Portillo Queen  MRN:   [id number]  :   1995  ACCT. NUMBER: [id number]  DATE OF SERVICE: 3/17/21  START TIME:  8:30 AM  END TIME:  9:30 AM  FACILITATOR(S): Rene King LADC; Lata Hooker LADC  TOPIC: BEH Group Therapy  Number of patients attending the group:  5  Group Length:  1 Hours    Group Therapy Type: Recovery strategies    Summary of Group / Topics Discussed:    Recovery Principles and Balanced lifestyle      Group Attendance:  Attended group session    Patient's response to the group topic/interactions:  cooperative with task    Patient appeared to be Attentive.        Client specific details:  Portillo gave appropriate feedback..

## 2021-03-17 NOTE — GROUP NOTE
Group Therapy Documentation    PATIENT'S NAME: Portillo Queen  MRN:   [id number]  :   1995  ACCT. NUMBER: [id number]  DATE OF SERVICE: 3/17/21  START TIME: 10:00 AM  END TIME: 11:30 AM  FACILITATOR(S): Paris Palmer  TOPIC: BEH Group Therapy  Number of patients attending the group:  5  Group Length:  2 Hours    Group Therapy Type: Recovery strategies, Emotion processing, and Daily living/independence skills    Summary of Group / Topics Discussed:    Recovery Principles, Sober coping skills, Relationship/socialization, Disease of addiction, and Self-care activities      Group Attendance:  Attended group session    Patient's response to the group topic/interactions:  cooperative with task    Patient appeared to be Actively participating, Attentive and Engaged.        Client specific details:  Patient was attentive and participative during group session.

## 2021-03-18 ENCOUNTER — HOSPITAL ENCOUNTER (OUTPATIENT)
Dept: BEHAVIORAL HEALTH | Facility: CLINIC | Age: 26
End: 2021-03-18
Attending: FAMILY MEDICINE
Payer: COMMERCIAL

## 2021-03-18 VITALS — OXYGEN SATURATION: 96 % | TEMPERATURE: 95.6 F

## 2021-03-18 PROCEDURE — 1002N00001 HC LODGING PLUS FACILITY CHARGE ADULT

## 2021-03-18 PROCEDURE — H2035 A/D TX PROGRAM, PER HOUR: HCPCS | Mod: HQ

## 2021-03-18 NOTE — GROUP NOTE
Group Therapy Documentation    PATIENT'S NAME: Portillo Queen  MRN:   [id number]  :   1995  ACCT. NUMBER: [id number]  DATE OF SERVICE: 3/18/21  START TIME:  9:00 AM  END TIME: 11:00 AM  FACILITATOR(S): Lata Hooker LADC  TOPIC: BEH Group Therapy  Number of patients attending the group: 6  Group Length:  2 Hours    Group Therapy Type: Recovery strategies    Summary of Group / Topics Discussed:    Recovery Principles and Relationship/socialization      Group Attendance:  Attended group session    Patient's response to the group topic/interactions:  cooperative with task    Patient appeared to be Engaged.        Client specific details:  Portillo participated in a group discussion on boundary setting and strengths exploration.

## 2021-03-18 NOTE — GROUP NOTE
Psychoeducation Group Documentation    PATIENT'S NAME: Portillo Queen  MRN:   [id number]  :   1995  ACCT. NUMBER: [id number]  DATE OF SERVICE: 3/18/21  START TIME:  3:00 PM  END TIME:  4:00 PM  FACILITATOR(S): Kendall Oakes LADC; Paris Palmer; Belen Fregoso LADC  TOPIC: BEH Pyschoeducation  Number of patients attending the group:  26  Group Length:  1 Hours    Skills Group Therapy Type: Recovery skills    Summary of Group / Topics Discussed:    Balanced lifestyle skills, Coping/DBT skills, and Symptom management skills.  Dr Alber Anaya brain impact related to anxiety and trauma          Group Attendance:  Attended group session    Patient's response to the group topic/interactions:  cooperative with task    Patient appeared to be Attentive and Engaged.         Client specific details:  .

## 2021-03-19 ENCOUNTER — HOSPITAL ENCOUNTER (OUTPATIENT)
Dept: BEHAVIORAL HEALTH | Facility: CLINIC | Age: 26
End: 2021-03-19
Attending: FAMILY MEDICINE
Payer: COMMERCIAL

## 2021-03-19 VITALS — OXYGEN SATURATION: 97 % | TEMPERATURE: 96.2 F

## 2021-03-19 PROCEDURE — H2035 A/D TX PROGRAM, PER HOUR: HCPCS | Mod: HQ

## 2021-03-19 PROCEDURE — 1002N00001 HC LODGING PLUS FACILITY CHARGE ADULT

## 2021-03-19 NOTE — GROUP NOTE
Group Therapy Documentation    PATIENT'S NAME: Portillo Queen  MRN:   [id number]  :   1995  ACCT. NUMBER: [id number]  DATE OF SERVICE: 3/19/21  START TIME:  9:00 AM  END TIME: 11:00 AM  FACILITATOR(S): Lata Hooker LADC; Belen Fregoso LADC  TOPIC: BEH Group Therapy  Number of patients attending the group:  6  Group Length:  2 Hours    Group Therapy Type: Recovery strategies    Summary of Group / Topics Discussed:    Recovery Principles      Group Attendance:  Attended group session    Patient's response to the group topic/interactions:  cooperative with task    Patient appeared to be Engaged.        Client specific details:  Portillo participated in group check-in's and a discussion on building sober support.

## 2021-03-19 NOTE — GROUP NOTE
Group Therapy Documentation    PATIENT'S NAME: Portillo Queen  MRN:   [id number]  :   1995  ACCT. NUMBER: [id number]  DATE OF SERVICE: 3/19/21  START TIME: 12:30 PM  END TIME:  2:30 PM  FACILITATOR(S): Lata Hooker LADC  TOPIC: BEH Group Therapy  Number of patients attending the group:  6  Group Length:  2 Hours    Group Therapy Type: Recovery strategies    Summary of Group / Topics Discussed:    Leisure explorations/use of leisure time      Group Attendance:  Attended group session    Patient's response to the group topic/interactions:  cooperative with task    Patient appeared to be Attentive.        Client specific details:  Portillo participated in a sober leisure activity and group discussion on family networks.

## 2021-03-20 ENCOUNTER — HOSPITAL ENCOUNTER (OUTPATIENT)
Dept: BEHAVIORAL HEALTH | Facility: CLINIC | Age: 26
End: 2021-03-20
Attending: FAMILY MEDICINE
Payer: COMMERCIAL

## 2021-03-20 VITALS — TEMPERATURE: 97.6 F | OXYGEN SATURATION: 98 %

## 2021-03-20 PROCEDURE — 1002N00001 HC LODGING PLUS FACILITY CHARGE ADULT

## 2021-03-20 PROCEDURE — H2035 A/D TX PROGRAM, PER HOUR: HCPCS | Mod: HQ

## 2021-03-20 NOTE — GROUP NOTE
Group Therapy Documentation    PATIENT'S NAME: Portillo Queen  MRN:   [id number]  :   1995  ACCT. NUMBER: [id number]  DATE OF SERVICE: 3/20/21  START TIME:  9:00 AM  END TIME: 11:00 AM  FACILITATOR(S): Shirley Nicole LADC; Kendall Oakes LADC; Arsalan Montgomery LADC  TOPIC: BEH Group Therapy  Number of patients attending the group: 24  Group Length:  2 Hours    Group Therapy Type: Recovery strategies    Summary of Group / Topics Discussed:    Relationship/socialization      Group Attendance:  Attended group session    Patient's response to the group topic/interactions:  cooperative with task    Patient appeared to be Attentive and Engaged.        Client specific details: Portillo was an active participant in morning session of weekend Relationships workshop.

## 2021-03-20 NOTE — GROUP NOTE
Group Therapy Documentation    PATIENT'S NAME: Portillo Queen  MRN:   [id number]  :   1995  ACCT. NUMBER: [id number]  DATE OF SERVICE: 3/20/21  START TIME: 12:30 PM  END TIME:  2:30 PM  FACILITATOR(S): Shirley Nicole LADC; Arsalan Montgomery LADC; Kendall Oakes LADC  TOPIC: BEH Group Therapy  Number of patients attending the group: 23  Group Length:  2 Hours    Group Therapy Type: Recovery strategies    Summary of Group / Topics Discussed:    Relationship/socialization      Group Attendance:  Attended group session    Patient's response to the group topic/interactions:  cooperative with task    Patient appeared to be Attentive and Engaged.        Client specific details: Portillo was an active participant in afternoon session of weekend Relationships workshop.

## 2021-03-21 ENCOUNTER — HOSPITAL ENCOUNTER (OUTPATIENT)
Dept: BEHAVIORAL HEALTH | Facility: CLINIC | Age: 26
End: 2021-03-21
Attending: FAMILY MEDICINE
Payer: COMMERCIAL

## 2021-03-21 VITALS — OXYGEN SATURATION: 97 % | TEMPERATURE: 98.1 F

## 2021-03-21 PROCEDURE — H2035 A/D TX PROGRAM, PER HOUR: HCPCS | Mod: HQ

## 2021-03-21 PROCEDURE — 1002N00001 HC LODGING PLUS FACILITY CHARGE ADULT

## 2021-03-21 NOTE — GROUP NOTE
Group Therapy Documentation    PATIENT'S NAME: oPrtillo Queen  MRN:   [id number]  :   1995  ACCT. NUMBER: [id number]  DATE OF SERVICE: 3/21/21  START TIME:  8:45 AM  END TIME: 10:30 AM  FACILITATOR(S): Shirley Nicole LADC; Valorie Luciano RN; Kendall Oakes LADC  TOPIC: BEH Group Therapy  Number of patients attending the group: 23  Group Length:  2 Hours    Group Therapy Type: Health and wellbeing     Summary of Group / Topics Discussed:    Self-care activities      Group Attendance:  Attended group session    Patient's response to the group topic/interactions:  cooperative with task    Patient appeared to be Attentive and Engaged.        Client specific details: Portillo was an active participant in staff RN lecture on HIV/AIDS and COVID-19.

## 2021-03-21 NOTE — GROUP NOTE
"Group Therapy Documentation    PATIENT'S NAME: Portillo Queen  MRN:   [id number]  :   1995  ACCT. NUMBER: [id number]  DATE OF SERVICE: 3/21/21  START TIME: 12:30 PM  END TIME:  1:30 PM  FACILITATOR(S): Shirley Nicole LADC; Kendall Oakes LADC  TOPIC: BEH Group Therapy  Number of patients attending the group: 23  Group Length:  1 Hours    Group Therapy Type: Recovery strategies    Summary of Group / Topics Discussed:    Relationship/socialization      Group Attendance:  Attended group session    Patient's response to the group topic/interactions:  cooperative with task    Patient appeared to be Attentive and Engaged.        Client specific details: Portillo was an active participant in Skills Group on \"Honesty\".    "

## 2021-03-22 ENCOUNTER — HOSPITAL ENCOUNTER (OUTPATIENT)
Dept: BEHAVIORAL HEALTH | Facility: CLINIC | Age: 26
End: 2021-03-22
Attending: FAMILY MEDICINE
Payer: COMMERCIAL

## 2021-03-22 VITALS — TEMPERATURE: 97.9 F | OXYGEN SATURATION: 98 %

## 2021-03-22 PROCEDURE — 1002N00001 HC LODGING PLUS FACILITY CHARGE ADULT

## 2021-03-22 PROCEDURE — H2035 A/D TX PROGRAM, PER HOUR: HCPCS | Mod: HQ

## 2021-03-22 NOTE — PROGRESS NOTES
[facility] Weekly Treatment Plan Review    ATTENDANCE for the following date span: 3/15/21-3/21/21    Day Monday Tuesday Wednesday Thursday Friday Saturday Sunday   Group Hours   4 4 3 4 4 4 2   Skills Hours    1 1 1   1   Individual Session (LADC)            Individual Session  (psychotherapy)     1       Peer-led Recovery Group   1 1 1 1 1 1 1     Patient: Portillo Queen            Adult CD Progress Note and Treatment Plan Review     Attendance  Please refer to OP BEH CD Adult Attendance Record Documentation Flowsheet    Support group attended this week: Yes    Reporting sobriety:  Yes    Treatment Plan     Treatment Plan Review completed on: 3/22/21       Client preferred learning style: Visual  Hands on  Verbal  Demonstration    Staff Members contributing: Lata Hooker, ULISES: Shirley Nicole [facility]; JANN Rosario; Belen Fregoso [facility]                       Received Supervision: No    Client: Patient contributed to goals and plan.    Client received copy of plan/revised plan: Yes    Client agrees with plan/revised plan: Yes    Changes to Treatment Plan: None    New Goals added since last review: No additional goals added at this time    Goals worked on since last review: Drug Use History assignment, Relationships workshop, spirituality group, 1:1 therapy, aftercare planning, coping skills, and developing sober support.     Strategies effective: Yes    Strategies need these changes: No changes needed at this time    1) Care Coordination Activities: Patient was given resources for sober housing and outpatient programming.  2) Medical, Mental Health and other appointments the client attended: Patient attended an individual therapy appointment with staff therapist BUSHRA Sutton on 3/17/21.  3) Medication issues: None reported.  4) Physical and mental health problems: None reported.  5) Any changes in Vulnerable Adult Status?  No If yes, add to treatment plan and individual abuse  "prevention plan.  6) Review and evaluation of the individual abuse prevention plan: Current IAPP for this program is adequate for this client    ASAM Risk Rating:    Dimension 1, 0: Patient reports his last date of use as 3/2/21. Patient denies any withdrawal symptoms that would interfere with full participation in treatment programming at this time. Patient will continue to be monitored throughout treatment.     Dimension 2, 1: Patient denies any biomedical concerns that would interfere with full participation in treatment programming at this time. Patient reports that he is currently medication compliant and is able to access medical aid as needed.  Patient will continue to be monitored throughout treatment. Patient attended staff RN lecture on HIV/AIDS.    Dimension 3, 2: Patient reports mental health diagnoses of depression, generalized anxiety, ADHD, and PTSD. Patient met with staff therapist Elaine Dinh for an individual therapy session.  Patient denies any significant changes to his mood or stress level this week. Patient reports \"Deep breathing\" as his primary coping skills he uses to manage stress and difficult emotions.  Patient denies any suicidal thoughts or ideations at this time. Patient will continue to be monitored throughout treatment.     Dimension 4, 1: Patient verbally reports being motivated for long-term recovery. Patient reports \"my family\" as his primary motivation to stay sober and in treatment this week.  Patient's group attendance and participation have been positive, and he appears to be increasing his internal motivation for change.      Dimension 5, 4: Patient rated his cravings this week on a scale from 1(low) to 10(high) as a \"4\".  He reports \"finding something to do\" as the coping skills he has utilized to manage these cravings.    Dimension 6, 3: Patient is attending sober support meetings and building relationships with peers. Patient's aftercare plan at this time includes " attending outpatient programming, 12-step meetings, and maintaining sober connections. Patient received information and is considering evening outpatient programming at [facility].     Guide to Risk Ratings for Suicidality:   IDEATION: Active thoughts of suicide? INTENT: Intent to follow on suicide? PLAN: Plan to follow through on suicide? Level of Risk:   IF Yes Yes Yes Patient = High Emergent   IF Yes Yes No Patient = High Urgent/Non-Emergent   IF Yes No No Patient = Moderate Non-Urgent   IF No No   No Patient = Low Risk   The patient's ADDITIONAL RISK FACTORS and lack of PROTECTIVE FACTORS may increase their overall suicide risk ratings.     Patient's/client's current risk rating:  Low Risk    Family Involvement:   none schedule this week    Data:   offered feedback client did actively participate developing insight      Intervention:   Aftercare planning  Behavior modification  Cognitive Behavioral Therapy  Counselor feedback  Education  Emotional management  Group feedback  Relapse prevention  Twelve Step facilitation  Mental health education    Assessment:   Stages of Change Model  Contemplation    Appears/Sounds:  Cooperative  Motivated  Engaged    Plan:  Focus on recovery environment  Monitor emotional/physical health  Continue working through treatment plan goals.       JANN Bermudez

## 2021-03-24 ENCOUNTER — HOSPITAL ENCOUNTER (OUTPATIENT)
Dept: BEHAVIORAL HEALTH | Facility: CLINIC | Age: 26
End: 2021-03-24
Attending: FAMILY MEDICINE
Payer: COMMERCIAL

## 2021-03-24 VITALS — OXYGEN SATURATION: 98 % | TEMPERATURE: 97.8 F

## 2021-03-24 PROCEDURE — 1002N00001 HC LODGING PLUS FACILITY CHARGE ADULT

## 2021-03-24 PROCEDURE — H2035 A/D TX PROGRAM, PER HOUR: HCPCS

## 2021-03-24 PROCEDURE — H2035 A/D TX PROGRAM, PER HOUR: HCPCS | Mod: HQ

## 2021-03-24 NOTE — GROUP NOTE
Group Therapy Documentation    PATIENT'S NAME: Portillo Queen  MRN:   [id number]  :   1995  ACCT. NUMBER: [id number]  DATE OF SERVICE: 3/24/21  START TIME: 12:30 PM  END TIME:  2:30 PM  FACILITATOR(S): Janet Del Rosario; Lata Hooker LADC  TOPIC: BEH Group Therapy  Number of patients attending the group:  4  Group Length:  2 Hours    Group Therapy Type: Recovery strategies    Summary of Group / Topics Discussed:    Spiritual Care      Group Attendance:  Attended group session    Patient's response to the group topic/interactions:  cooperative with task    Patient appeared to be Engaged.        Client specific details: Portillo participated in spirituality group facilitated by Janet Del Rosario.

## 2021-03-24 NOTE — GROUP NOTE
Group Therapy Documentation    PATIENT'S NAME: Portillo Queen  MRN:   [id number]  :   1995  ACCT. NUMBER: [id number]  DATE OF SERVICE: 3/24/21  START TIME:  8:30 AM  END TIME:  9:30 AM  FACILITATOR(S): Rene King LADC  TOPIC: BEH Group Therapy  Number of patients attending the group:  4  Group Length:  1 Hours    Group Therapy Type: Recovery strategies and Medication education    Summary of Group / Topics Discussed:    Recovery Principles and Medication management      Group Attendance:  Attended group session    Patient's response to the group topic/interactions:  cooperative with task    Patient appeared to be Attentive.        Client specific details:  Portillo gave appropriate feedback..

## 2021-03-24 NOTE — GROUP NOTE
"Group Therapy Documentation    PATIENT'S NAME: Portillo Queen  MRN:   [id number]  :   1995  ACCT. NUMBER: [id number]  DATE OF SERVICE: 3/24/21  START TIME: 10:00 AM  END TIME: 11:30 AM  FACILITATOR(S): Paris Palmer  TOPIC: BEH Group Therapy  Number of patients attending the group: 4  Group Length: 2 hours    Group Therapy Type: Recovery strategies, Daily living/independence skills, and Health and wellbeing     Summary of Group / Topics Discussed:    Recovery Principles, Sober coping skills, Relationship/socialization, and Relapse prevention      Group Attendance:  Attended group session    Patient's response to the group topic/interactions:  cooperative with task    Patient appeared to be Actively participating, Attentive and Engaged.        Client specific details:  Patient completed his \"Drug Use History\" assignment and presented in group. Patient was very thorough, appeared to learn a lot from his using past and inspired lots of positive feedback from his group peers.  "

## 2021-03-25 ENCOUNTER — HOSPITAL ENCOUNTER (OUTPATIENT)
Dept: BEHAVIORAL HEALTH | Facility: CLINIC | Age: 26
End: 2021-03-25
Attending: FAMILY MEDICINE
Payer: COMMERCIAL

## 2021-03-25 VITALS — OXYGEN SATURATION: 98 % | TEMPERATURE: 98 F

## 2021-03-25 PROCEDURE — H2035 A/D TX PROGRAM, PER HOUR: HCPCS | Mod: HQ

## 2021-03-25 PROCEDURE — 1002N00001 HC LODGING PLUS FACILITY CHARGE ADULT

## 2021-03-25 NOTE — GROUP NOTE
Group Therapy Documentation    PATIENT'S NAME: Portillo Queen  MRN:   [id number]  :   1995  ACCT. NUMBER: [id number]  DATE OF SERVICE: 3/25/21  START TIME: 12:30 PM  END TIME:  2:30 PM  FACILITATOR(S): Lata Hooker LADC  TOPIC: BEH Group Therapy  Number of patients attending the group:  6  Group Length:  2 Hours    Group Therapy Type: Recovery strategies    Summary of Group / Topics Discussed:    Cognitive behavioral therapy skills and Disease of addiction      Group Attendance:  Attended group session    Patient's response to the group topic/interactions:  cooperative with task    Patient appeared to be Actively participating.        Client specific details:  Portillo participated in a CBT and thought dispute exercise and educational activity on alcohol use.

## 2021-03-25 NOTE — GROUP NOTE
Group Therapy Documentation    PATIENT'S NAME: Portillo Queen  MRN:   [id number]  :   1995  ACCT. NUMBER: [id number]  DATE OF SERVICE: 3/25/21  START TIME:  3:00 PM  END TIME:  4:00 PM  FACILITATOR(S): Shirley Nicole LADC; Belen Fregoso LADC; Kendall Oakes LADC  TOPIC: BEH Group Therapy  Number of patients attending the group: 23  Group Length:  1 Hours    Group Therapy Type: Recovery strategies    Summary of Group / Topics Discussed:    Disease of addiction      Group Attendance:  Attended group session    Patient's response to the group topic/interactions:  cooperative with task    Patient appeared to be Attentive and Engaged.        Client specific details: Portillo participated in Skills Group lecture presented by Dr. Hamida Mary.

## 2021-03-25 NOTE — GROUP NOTE
Group Therapy Documentation    PATIENT'S NAME: Portillo Queen  MRN:   [id number]  :   1995  ACCT. NUMBER: [id number]  DATE OF SERVICE: 3/25/21  START TIME:  9:00 AM  END TIME: 11:00 AM  FACILITATOR(S): Paris Palmer  TOPIC: BEH Group Therapy  Number of patients attending the group:  6  Group Length:  2 Hours    Group Therapy Type: Recovery strategies, Emotion processing, and Health and wellbeing     Summary of Group / Topics Discussed:    Recovery Principles, Sober coping skills, Balanced lifestyle, Disease of addiction, and Relapse prevention      Group Attendance:  Attended group session    Patient's response to the group topic/interactions:  cooperative with task    Patient appeared to be Actively participating, Attentive and Engaged.        Client specific details:  Patient was attentive and participative during group session.

## 2021-03-25 NOTE — PROGRESS NOTES
INDIVIDUAL SESSION SUMMARY    D) Met with client on 3/24/21 from 9:45-10:30am. Client is in [facility]. Client reported that he had not yet finalized his plans to be [facility] and that he would speak with his counselor, Lata, later today. Client shared that he would like an AA sponsor and identified a guest speaker, Zeyad, that he will plan to contact. Client discussed his plans to stay busy when he returns home next week and has a week off work because of [employer]'s spring break. Client shared that he intends to spend quality time with his father, exercise and attend meetings. Client discussed how in the past his mom has been overly-engaged in managing his life as a result of him being under-responsible. Client shared that it's important to rebuild trust with his family as well as his girlfriend's mom.     I) Individual session with client. Discussed how taking more responsibility and holding himself accountable will help his parents relax from over-managing his life. Provided feedback on how to approach girlfriend's mother. Provided referrals/resources for individual therapy in the community.     A) Client appears to desire a sober support network but has yet to take action to clearly define what support he will have upon returning home. Client appears to have moderate motivation and would benefit from continuing support to help with stress management, emotional regulation, impulse control, boundaries with parents and friends, and relapse prevention..     P) Next session is scheduled for 3/31/21.   Elaine Dinh, BUSHRA  3/25/2021

## 2021-03-26 ENCOUNTER — HOSPITAL ENCOUNTER (OUTPATIENT)
Dept: BEHAVIORAL HEALTH | Facility: CLINIC | Age: 26
End: 2021-03-26
Attending: FAMILY MEDICINE
Payer: COMMERCIAL

## 2021-03-26 VITALS — TEMPERATURE: 97.9 F | OXYGEN SATURATION: 98 %

## 2021-03-26 PROCEDURE — H2035 A/D TX PROGRAM, PER HOUR: HCPCS | Mod: HQ

## 2021-03-26 PROCEDURE — 1002N00001 HC LODGING PLUS FACILITY CHARGE ADULT

## 2021-03-26 NOTE — PROGRESS NOTES
Patient signed an JACINTA and his assessment and progress notes faxed to intake at [facility] for review. Patient is seeking evening outpatient programming after completing [facility].

## 2021-03-26 NOTE — GROUP NOTE
Group Therapy Documentation    PATIENT'S NAME: Portillo Queen  MRN:   [id number]  :   1995  ACCT. NUMBER: [id number]  DATE OF SERVICE: 3/26/21  START TIME:  9:00 AM  END TIME: 11:00 AM  FACILITATOR(S): Paris Palmer  TOPIC: BEH Group Therapy  Number of patients attending the group:  6  Group Length:  2 Hours    Group Therapy Type: Recovery strategies, Emotion processing, and Daily living/independence skills    Summary of Group / Topics Discussed:    Recovery Principles, Sober coping skills, Relationship/socialization, Balanced lifestyle, and Relapse prevention      Group Attendance:  Attended group session    Patient's response to the group topic/interactions:  cooperative with task    Patient appeared to be Actively participating, Attentive and Engaged.        Client specific details:  Patient was attentive and participative during group session..

## 2021-03-26 NOTE — GROUP NOTE
"Group Therapy Documentation    PATIENT'S NAME: Portillo Queen  MRN:   [id number]  :   1995  ACCT. NUMBER: [id number]  DATE OF SERVICE: 3/26/21  START TIME: 12:30 PM  END TIME:  2:30 PM  FACILITATOR(S): Cy Patterson LADC; Paris Palmer; Lata Hooker LADC  TOPIC: BEH Group Therapy  Number of patients attending the group:  6  Group Length:  2 Hours    Group Therapy Type: Recovery strategies    Summary of Group / Topics Discussed:    Leisure explorations/use of leisure time      Group Attendance:  Attended group session    Patient's response to the group topic/interactions:  cooperative with task    Patient appeared to be Attentive.        Client specific details:  Portillo participated in a sober leisure activity and discussion on the movie \"Soul.\"   "

## 2021-03-27 ENCOUNTER — HOSPITAL ENCOUNTER (OUTPATIENT)
Dept: BEHAVIORAL HEALTH | Facility: CLINIC | Age: 26
End: 2021-03-27
Attending: FAMILY MEDICINE
Payer: COMMERCIAL

## 2021-03-27 VITALS — TEMPERATURE: 97.9 F | OXYGEN SATURATION: 98 %

## 2021-03-27 PROCEDURE — H2035 A/D TX PROGRAM, PER HOUR: HCPCS | Mod: HQ

## 2021-03-27 PROCEDURE — 1002N00001 HC LODGING PLUS FACILITY CHARGE ADULT

## 2021-03-27 NOTE — GROUP NOTE
Group Therapy Documentation    PATIENT'S NAME: Portillo Queen  MRN:   [id number]  :   1995  ACCT. NUMBER: [id number]  DATE OF SERVICE: 3/27/21  START TIME:  1:00 PM  END TIME:  3:00 PM  FACILITATOR(S): Ricardo Jones LADC; Mere Vicente LADC; Carl Reynoso LADC  TOPIC: BEH Group Therapy  Number of patients attending the group:  26  Group Length:  2 Hours    Group Therapy Type: Recovery strategies    Summary of Group / Topics Discussed:    Recovery Principles      Group Attendance:  Attended group session    Patient's response to the group topic/interactions:  cooperative with task    Patient appeared to be Attentive.        Client specific details:  Portillo attended PM workshop session. Pt participated in a group project that looked at their personal strengths. They then discussed how those strengths can benefit their relationships in recovery.

## 2021-03-27 NOTE — GROUP NOTE
Group Therapy Documentation    PATIENT'S NAME: Portillo Queen  MRN:   [id number]  :   1995  ACCT. NUMBER: [id number]  DATE OF SERVICE: 3/27/21  START TIME:  9:00 AM  END TIME: 11:00 AM  FACILITATOR(S): Mere Vicente LADC; Ricardo Jones LADC; Carl Reynoso [facility]  TOPIC: BEH Group Therapy  Number of patients attending the group:  26  Group Length:  2 Hours    Group Therapy Type: Recovery strategies    Summary of Group / Topics Discussed:    Relationship/socialization      Group Attendance:  Attended group session    Patient's response to the group topic/interactions:  cooperative with task    Patient appeared to be Attentive.        Client specific details: Pt was appropriate and attentive in group, during lecture on Love in Relationship, and Codependency, this AM.

## 2021-03-28 ENCOUNTER — HOSPITAL ENCOUNTER (OUTPATIENT)
Dept: BEHAVIORAL HEALTH | Facility: CLINIC | Age: 26
End: 2021-03-28
Attending: FAMILY MEDICINE
Payer: COMMERCIAL

## 2021-03-28 VITALS — OXYGEN SATURATION: 98 % | TEMPERATURE: 96.9 F

## 2021-03-28 PROCEDURE — H2035 A/D TX PROGRAM, PER HOUR: HCPCS | Mod: HQ

## 2021-03-28 PROCEDURE — 1002N00001 HC LODGING PLUS FACILITY CHARGE ADULT

## 2021-03-28 NOTE — PROGRESS NOTES
Nursing Discharge Planning Meeting    Writer completed discharge planning meeting with patient. Discharge is planned for .    Discussed appropriate follow up care to manage BAILEE, MH and medical and to obtain medication refills. Patient given a copy of their current medications for reference. Questions were answered at this time and the patient verbalized an understanding of the post-discharge follow up plan.    Patient to schedule an appointment with their PCP and Care Coordinator as recommended:    Kim Guardado MD    [facility] [place] [address]    Phone: [phone]    Fax: [phone]   Kim Guardado MD    [address] [place] [address]    Phone: [phone]    Fax: [phone]        Dorina Rodriguez, [facility]  Care coordinator  [address] [place] [address]    [phone]     Continue to support patient in discharge planning as needed to assure appropriate continuity of care.     Tobacco Cessation  Patient participated in the nicotine replacement therapy for tobacco cessation or reduction during their treatment programming: No.  Pt is not interested in tobacco cessation    The patient was provided with community resources for follow-up to continue tobacco cessation support once in the community. Also the patient was encoruaged to discuss their tobacco cessation efforts with the primary care provider.

## 2021-03-28 NOTE — GROUP NOTE
Group Therapy Documentation    PATIENT'S NAME: Portillo Queen  MRN:   [id number]  :   1995  ACCT. NUMBER: [id number]  DATE OF SERVICE: 3/28/21  START TIME: 12:30 AM  END TIME:  1:30 PM  FACILITATOR(S): Ricardo Jones LADC; Mere Vicente LADC  TOPIC: BEH Group Therapy  Number of patients attending the group: 26  Group Length:  2 Hours    Group Therapy Type: Recovery strategies    Summary of Group / Topics Discussed:    Relationship/socialization      Group Attendance:  Attended group session    Patient's response to the group topic/interactions:  cooperative with task    Patient appeared to be Attentive.        Client specific details:  Pt was appropriate and attentive in group, during Skills lecture this PM on  Relationship .

## 2021-03-28 NOTE — IP AVS SNAPSHOT
Medication List       Patient: RADHA LUGO   : 1995   Physician: Kim Guardado MD           This is your record.  Keep this with you and show to your community pharmacist(s) and physician(s) at each visit.     Allergies:  AMOXICILLIN - Rash     PENICILLINS - Rash     SULFA DRUGS - Rash               Medications  Valid as of: 2021 -  4:30 PM    Generic Name Brand Name Tablet Size Instructions for use    Atorvastatin Calcium LIPITOR 10 MG Take 1 tablet (10 mg) by mouth daily        Ergocalciferol ERGOCALCIFEROL 19253 units (1250 mcg) Take 1 capsule (50,000 Units) by mouth once a week        FLUoxetine HCl PROzac 40 MG Take 1 capsule (40 mg) by mouth daily        Gabapentin NEURONTIN 300 MG Take 1 capsule (300 mg) by mouth 3 times daily as needed (anxiety)        Lisinopril ZESTRIL 30 MG Take 1 tablet (30 mg) by mouth daily        metFORMIN HCl GLUCOPHAGE- MG Take 1 tablet (500 mg) by mouth daily (with breakfast)        Multiple Vitamins-Minerals THERA-VIT-M  Take 1 tablet by mouth daily        PHENobarbital LUMINAL 32.4 MG Take 1 tablet (32.4 mg) by mouth 2 times daily On 3/5 take 1 tab bid x1 day, then3/6 take 1 tab at bedtime        Thiamine HCl B-1 100 MG Take 1 tablet (100 mg) by mouth daily        traZODone HCl DESYREL 50 MG Take 1 tablet (50 mg) by mouth nightly as needed for sleep        .           .           .           .

## 2021-03-28 NOTE — GROUP NOTE
Group Therapy Documentation    PATIENT'S NAME: Portillo Queen  MRN:   [id number]  :   1995  ACCT. NUMBER: [id number]  DATE OF SERVICE: 3/28/21  START TIME:  9:00 AM  END TIME: 11:00 AM  FACILITATOR(S): Ricardo Jones [facility]; Mere Vicente [facility]  TOPIC: BEH Group Therapy  Number of patients attending the group:  26  Group Length:  2 Hours    Group Therapy Type: Recovery strategies    Summary of Group / Topics Discussed:    Recovery Principles and Self-care activities      Group Attendance:  Attended group session    Patient's response to the group topic/interactions:  cooperative with task    Patient appeared to be Attentive.        Client specific details:  Portillo  attended AM group. They took part in a lecture discussion on laughter, TB, Hep A-C. Patient was attentive and engaged.

## 2021-03-28 NOTE — IP AVS SNAPSHOT
"After Visit Summary Template Not Found    This Print Group is only intended to be used in the After Visit Summary and can only be used in a report that uses a released After Visit Summary Template.                       MRN:[id number]                      After Visit Summary   3/28/2021    Portillo Queen    MRN: [id number]           Visit Information        Provider Department      3/28/2021  7:00 AM [facility]ULT [facility]        Your next 10 appointments already scheduled    Mar 29, 2021  7:00 AM  Treatment with [facility]ULT [facility]  [facility] ([facility] ) [address] [place] [address]  [phone]      Mar 30, 2021  7:00 AM  Treatment with ADULT [facility] C  [facility] ([facility] ) [address] [place] [address]  [phone]      Mar 31, 2021  7:00 AM  Treatment with ADULT [facility] C  [facility] ([facility] ) [address] [place] [address]  [phone]      Apr 01, 2021  7:00 AM  Treatment with ADULT [facility] C  [facility] ([facility] ) [address] [place] [address]  [phone]         MyChart Information    [employer] lets you send messages to your doctor, view your test results, renew your prescriptions, schedule appointments and more. To sign up, go to [email or web address] . Click on \"Log in\" on the left side of the screen, which will take you to the Welcome page. Then click on \"Sign up Now\" on the right side of the page.     You will be asked to enter the access code " listed below, as well as some personal information. Please follow the directions to create your username and password.     Your access code is: [email or web address]  Expires: 2021 12:30 PM     Your access code will  in 60 days. If you need help or a new code, please call your   [facility] clinic or [phone].       Care EveryWhere ID    This is your Care EveryWhere ID. This could be used by other organizations to access your [facility] medical records  [id number]       Equal Access to Services    [facility]: Hadii aad rafael hadasho Soomaali, waaxda luqadaha, qaybta kaalmada adeegyada, brandi palacio . So [facility] [phone].    ATENCIÓN: Si habla español, tiene a pereyra disposición servicios gratuitos de asistencia lingüística. Llame al [phone].    We comply with applicable federal and state civil rights laws, including the [place] Human Rights Act. We do not discriminate on the basis of race, color, creed, Gnosticism, national origin, marital status, age, disability, sex, sexual orientation, or gender identity.    If you would like an itemization of your charges they will now be available in [employer] 30 days after discharge. To access the itemized statements in [employer] go to billing/billing summary. From there select view account. There will be multiple tabs showing an overview of your account, detail, payments, and communications. From the communications tab you can see your monthly statements, your itemized statements, and any billing letters generated for your account. If you do not have a [employer] account and need help getting access please contact [employer] support at [phone].  If you would prefer to have your itemized statements mailed please contact our automated itemized bill request line at [phone] option  2.

## 2021-03-29 ENCOUNTER — HOSPITAL ENCOUNTER (OUTPATIENT)
Dept: BEHAVIORAL HEALTH | Facility: CLINIC | Age: 26
End: 2021-03-29
Attending: FAMILY MEDICINE
Payer: COMMERCIAL

## 2021-03-29 VITALS — TEMPERATURE: 98.2 F | OXYGEN SATURATION: 97 %

## 2021-03-29 PROCEDURE — 1002N00001 HC LODGING PLUS FACILITY CHARGE ADULT

## 2021-03-29 PROCEDURE — H2035 A/D TX PROGRAM, PER HOUR: HCPCS | Mod: HQ

## 2021-03-29 NOTE — ADDENDUM NOTE
Encounter addended by: Higinio Jones on: 3/28/2021 11:30 PM   Actions taken: Charge Capture section accepted

## 2021-03-29 NOTE — GROUP NOTE
Group Therapy Documentation    PATIENT'S NAME: Portillo Queen  MRN:   [id number]  :   1995  ACCT. NUMBER: [id number]  DATE OF SERVICE: 3/29/21  START TIME: 12:30 PM  END TIME:  2:30 PM  FACILITATOR(S): Paris Palmer  TOPIC: BEH Group Therapy  Number of patients attending the group: 5  Group Length:  2 Hours    Group Therapy Type: Recovery strategies, Emotion processing, and Daily living/independence skills    Summary of Group / Topics Discussed:    Recovery Principles, Sober coping skills, Relationship/socialization, Emotions/expression, Relapse prevention, and Self-care activities      Group Attendance:  Attended group session    Patient's response to the group topic/interactions:  cooperative with task    Patient appeared to be Actively participating, Attentive and Engaged.        Client specific details:  Patient was attentive and participative during group session.

## 2021-03-29 NOTE — GROUP NOTE
Group Therapy Documentation    PATIENT'S NAME: Portillo Queen  MRN:   [id number]  :   1995  ACCT. NUMBER: [id number]  DATE OF SERVICE: 3/29/21  START TIME:  9:00 AM  END TIME: 11:00 AM  FACILITATOR(S): Cy Patterson LADC  TOPIC: BEH Group Therapy  Number of patients attending the group: 5  Group Length:  2 Hours    Group Therapy Type: Recovery strategies and Emotion processing    Summary of Group / Topics Discussed:    Recovery Principles, Sober coping skills, Trauma informed care, and Disease of addiction      Group Attendance:patient stated he was ready to get out and move on to next phase of treatment  Attended group session    Patient's response to the group topic/interactions:  cooperative with task    Patient appeared to be Attentive.        Client specific details:  Patient stated he was ready to move on to next phase of treatment. Has one assignment left but hasn't started it yet. At times seemed disinterested in group.

## 2021-03-30 ENCOUNTER — HOSPITAL ENCOUNTER (OUTPATIENT)
Dept: BEHAVIORAL HEALTH | Facility: CLINIC | Age: 26
End: 2021-03-30
Attending: FAMILY MEDICINE
Payer: COMMERCIAL

## 2021-03-30 VITALS — TEMPERATURE: 98.1 F | OXYGEN SATURATION: 95 %

## 2021-03-30 PROCEDURE — 1002N00001 HC LODGING PLUS FACILITY CHARGE ADULT

## 2021-03-30 PROCEDURE — H2035 A/D TX PROGRAM, PER HOUR: HCPCS | Mod: HQ

## 2021-03-30 NOTE — GROUP NOTE
Group Therapy Documentation    PATIENT'S NAME: Portillo Queen  MRN:   [id number]  :   1995  ACCT. NUMBER: [id number]  DATE OF SERVICE: 3/30/21  START TIME: 12:30 PM  END TIME:  2:30 PM  FACILITATOR(S): Belen Fregoso ADC-T  TOPIC: BEH Group Therapy  Number of patients attending the group: 7  Group Length:  2 Hours    Group Therapy Type: Recovery strategies and Emotion processing    Summary of Group / Topics Discussed:    Sober coping skills, Relationship/socialization, and Emotions/expression    Group Attendance:  Attended group session    Patient's response to the group topic/interactions:  cooperative with task and listened actively    Patient appeared to be Attentive and Engaged.        Client specific details: Pt participated in a group discussion of how group members react when they feel rejection, and how members behave towards people they don't like. The discussion included strategies for navigating difficult interactions including appropriate confrontation/assertiveness, giving or taking space, and consulting others. Pt identified his response to rejection as either assertion or giving space.

## 2021-03-30 NOTE — GROUP NOTE
Group Therapy Documentation    PATIENT'S NAME: Portillo Queen  MRN:   [id number]  :   1995  ACCT. NUMBER: [id number]  DATE OF SERVICE: 3/30/21  START TIME:  3:00 PM  END TIME:  4:00 PM  FACILITATOR(S): Belen Fregoso [facility]; Kendall Oakes [facility]; Mere Vicente [facility]  TOPIC: BEH Group Therapy  Number of patients attending the group: 27  Group Length:  2 Hours    Group Therapy Type: Daily living/independence skills    Summary of Group / Topics Discussed:    Sober coping skills      Group Attendance:  Attended group session    Patient's response to the group topic/interactions:  cooperative with task    Patient appeared to be Engaged.        Client specific details: Pt was appropriate and attentive in group, during Skills lecture this PM on Things we can Epes with and those we cannot .

## 2021-03-31 ENCOUNTER — HOSPITAL ENCOUNTER (OUTPATIENT)
Dept: BEHAVIORAL HEALTH | Facility: CLINIC | Age: 26
End: 2021-03-31
Attending: FAMILY MEDICINE
Payer: COMMERCIAL

## 2021-03-31 VITALS — TEMPERATURE: 97.6 F | OXYGEN SATURATION: 97 %

## 2021-03-31 PROCEDURE — 1002N00001 HC LODGING PLUS FACILITY CHARGE ADULT

## 2021-03-31 PROCEDURE — H2035 A/D TX PROGRAM, PER HOUR: HCPCS

## 2021-03-31 PROCEDURE — H2035 A/D TX PROGRAM, PER HOUR: HCPCS | Mod: HQ

## 2021-03-31 NOTE — PROGRESS NOTES
[facility] Weekly Treatment Plan Review    ATTENDANCE for the following date span: 3/22/21-3/28/21    Day Monday Tuesday Wednesday Thursday Friday Saturday Sunday   Group Hours   4 4 3 4 4 4 2   Skills Hours    1 1 1   1   Individual Session (LADC)            Individual Session  (psychotherapy)            Peer-led Recovery Group   1 1 1 1 1 1 1               Adult CD Progress Note and Treatment Plan Review     Attendance  Please refer to OP BEH CD Adult Attendance Record Documentation Flowsheet    Support group attended this week: yes    Reporting sobriety:  yes    Treatment Plan     Treatment Plan Review competed on: 3/31/21       Client preferred learning style: Visual  Hands on  Verbal  Demonstration    Staff Members contributing JANN Blount; JANN Rosario; BUSHRA Sutton                         Received Supervision: YES    Client: contributed to goals and plan: Yes    Client received copy of plan/revised plan: Yes    Client agrees with plan/revised plan: Yes    Changes to Treatment Plan: No    New Goals added since last review No additional goals added at this time    Goals worked on since last review: Relationship workshop, spirituality group, daily gratitude and inventory, 1:1 therapy, building sober relationships, and aftercare planning.    Strategies effective: yes    Strategies need these changes: No changes needed at this time    1) Care Coordination Activities:  Patient will be contacted by  [facility] intake office via cell number to initiate intake appointment scheduling.   2) Medical, Mental Health and other appointments the client attended: Individual therapy with BUSHRA Sutton on 3/24/21  3) Medication issues: See MAR  4) Physical and mental health problems: See Dimension 2 and 3  5) Any changes in Vulnerable Adult Status?  No If yes, add to treatment plan and individual abuse prevention plan.  6) Review and evaluation of the individual abuse prevention  "plan: Current IAPP for this program is adequate for this client    ASAM Risk Rating:  Dimension 1, : Patient reports their last date of use as 3/02/21. Patient denies any withdrawal symptoms that would interfere with full participation in treatment programming at this time.   Dimension 2,1 : Patient denies any biomedical concerns that would interfere with full participation in treatment programming at this time. Patient reports that he is currently medication compliant. Patient attended staff RN lecture on Hep C.  Dimension 3, 2:  Patient met with staff mental health therapist for individual therapy session this week. He reports his mood has improved and he is feeling \"happy.\"  Patient denies any suicidal thoughts or ideations at this time. Patient will continue to be monitored throughout treatment.   Dimension 4, 1: Patient reports current motivation for treatment is for \"family and ambition for sobriety\". Patient is participating in all groups and lectures and appears to be gaining internal motivation for change. Patient appears to be in the contemplation stage of change at this time.   Dimension 5,4 : Patient rates urges and cravings this week a \"4 \" on a scale of 1-10(high). Patient continues to learn and practice coping skills and verbalizes grounding skills  have been helpful. Patient reports family members who continue to use and would benefit from continued support with navigating unsupportive interpersonal relationships.   Dimension 6,3 : Patient is attending sober support meetings (via zoom)and building relationships with peers. Patient's aftercare plan at this time includes evening outpatient at [facility] and 12-step support. Patient plans to resume employment and will return to his parents home.    Guide to Risk Ratings for Suicidality:   IDEATION: Active thoughts of suicide? INTENT: Intent to follow on suicide? PLAN: Plan to follow through on suicide? Level of Risk:   IF Yes Yes Yes Patient = High " Emergent   IF Yes Yes No Patient = High Urgent/Non-Emergent   IF Yes No No Patient = Moderate Non-Urgent   IF No No   No Patient = Low Risk   The patient's ADDITIONAL RISK FACTORS and lack of PROTECTIVE FACTORS may increase their overall suicide risk ratings.     Patient's/client's current risk rating:  Low Risk    Family Involvement:   Facetime due to Covid-19 restrictions    Data:   offered feedback good insight client did participate    Intervention:   Aftercare planning  Behavior modification  Cognitive Behavioral Therapy  Counselor feedback  Education  Emotional management  Group feedback  Motivational Enhancement Therapy  Relapse prevention  Twelve Step facilitation  Mental health education    Assessment:   Stages of Change Model  Contemplation    Appears/Sounds:  Cooperative  Engaged  Anxious    Plan:  Focus on recovery environment  Monitor emotional/physical health  Continue working through treatment plan goals.     JANN Blount

## 2021-03-31 NOTE — GROUP NOTE
Group Therapy Documentation    PATIENT'S NAME: Portillo Queen  MRN:   [id number]  :   1995  ACCT. NUMBER: [id number]  DATE OF SERVICE: 3/31/21  START TIME: 10:00 AM  END TIME: 11:30 AM  FACILITATOR(S): Lata Hooker LADC  TOPIC: BEH Group Therapy  Number of patients attending the group:  7  Group Length:  2 Hours    Group Therapy Type: Recovery strategies    Summary of Group / Topics Discussed:    Sober coping skills      Group Attendance:  Attended group session    Patient's response to the group topic/interactions:  cooperative with task    Patient appeared to be Attentive.        Client specific details:  Portillo participated in a group PMRT exercise and reported increased feelings of relaxation. Portillo participated in a group role play on asking for help.    English

## 2021-03-31 NOTE — PROGRESS NOTES
"INDIVIDUAL SESSION SUMMARY    D) Met with client on 3/31/21 from 9:45-10:30am. Client is in the [facility] program. Client discussed his aftercare plans including: returning home to his pt, attending IOP through [facility], continuing with his job through [employer], finding AA meetings near home and keeping in touch with his sober peers. Client shared that he's been practicing positive thinking and gratitude and has noticed a shift away from his old negative thinking. Client discussed not wanting to be around \"negative\" people and that there are a few friends he will have to cut ties with when he gets home. Client spoke with excitement about spending some time next week with his dad before returning to work. Client shared that he signed up to be connected with a sober peer through [place] [employer] [facility].     I) Individual session with client. Provided client with verbal interventions including: validation, nurturing, compassion and support. Provided referrals/resources for individual therapy in [facility].    A) Client appears to have taken steps that will connect him with a sober support network in the community. Client appears to have gained insights into the importance of finding healthier ways to have fun with friends, stress management, impulse control, and is noticing that he feels overall healthier and goal oriented when sober.      P) No future sessions scheduled.   This therapist has provided the client with several therapist referrals to contact in the community.   Elaine Dinh, LMFT  3/31/2021    "

## 2021-03-31 NOTE — GROUP NOTE
Group Therapy Documentation    PATIENT'S NAME: Portillo Queen  MRN:   [id number]  :   1995  ACCT. NUMBER: [id number]  DATE OF SERVICE: 3/31/21  START TIME:  8:30 AM  END TIME:  9:30 AM  FACILITATOR(S): [facility]; Venkat Roque, PhD LP  TOPIC: BEH Group Therapy  Number of patients attending the group:  7  Group Length:  1 Hours    Group Therapy Type: Recovery strategies and Emotion processing    Summary of Group / Topics Discussed:    Cognitive behavioral therapy skills, Disease of addiction, and Emotions/expression      Group Attendance:  Attended group session    Patient's response to the group topic/interactions:  cooperative with task    Patient appeared to be Attentive.        Client specific details:  Portillo gave appropriate feedback..

## 2021-03-31 NOTE — GROUP NOTE
Group Therapy Documentation    PATIENT'S NAME: Portillo Queen  MRN:   [id number]  :   1995  ACCT. NUMBER: [id number]  DATE OF SERVICE: 3/31/21  START TIME: 12:30 PM  END TIME:  2:30 PM  FACILITATOR(S): Belen Fregoso ADC-T  TOPIC: BEH Group Therapy  Number of patients attending the group: 7  Group Length:  2 Hours    Group Therapy Type: Emotion processing and Daily living/independence skills    Summary of Group / Topics Discussed:    Spiritual Care, Balanced lifestyle, and Emotions/expression    Group Attendance:  Attended group session    Patient's response to the group topic/interactions:  cooperative with task and discussed personal experience with topic    Patient appeared to be Actively participating, Attentive and Engaged.        Client specific details: Pt participated in spirituality group, facilitated by Janet Del Rosario.

## 2021-04-01 ENCOUNTER — HOSPITAL ENCOUNTER (OUTPATIENT)
Dept: BEHAVIORAL HEALTH | Facility: CLINIC | Age: 26
End: 2021-04-01
Attending: FAMILY MEDICINE
Payer: COMMERCIAL

## 2021-04-01 PROCEDURE — H2035 A/D TX PROGRAM, PER HOUR: HCPCS | Mod: HQ

## 2021-04-01 NOTE — PROGRESS NOTES
[address] [place] [address]          Portillo Queen, 1995, was admitted for evaluation/treatment of chemical dependency at [facility].  This person took part in these program(s):    ______ The Inpatient Program   ______ The Outpatient Program   __X___ The [facility] Program   ______ Lodging Day Outpatient       Date admitted: 3/05/2021  Date discharged: 4/01    Type of discharge:   _X____ Satisfactory - completed evaluation / treatment   ______ Discharged without completing   ______ Behavioral discharge   ______ Transferred to another chemical dependency program   ______ Transferred to another type of service   ______ Left against medical advice (AMA) / Eloped         Counselor: JANN Blount                       Date: 4/1/2021             Time: 11:19 AM

## 2021-04-01 NOTE — GROUP NOTE
Group Therapy Documentation    PATIENT'S NAME: Portillo Queen  MRN:   [id number]  :   1995  ACCT. NUMBER: [id number]  DATE OF SERVICE: 21  START TIME: 12:30 PM  END TIME:  2:30 PM  FACILITATOR(S): Belen Fregoso LADC  TOPIC: BEH Group Therapy  Number of patients attending the group:  7  Group Length:  2 Hours    Group Therapy Type: Recovery strategies and Daily living/independence skills    Summary of Group / Topics Discussed:    Sober coping skills, Relationship/socialization, and Balanced lifestyle      Group Attendance:  Excused from group session    Patient's response to the group topic/interactions:       Patient appeared to be  .        Client specific details:

## 2021-04-01 NOTE — GROUP NOTE
Group Therapy Documentation    PATIENT'S NAME: Portillo Queen  MRN:   [id number]  :   1995  ACCT. NUMBER: [id number]  DATE OF SERVICE: 21  START TIME:  9:00 AM  END TIME: 11:00 AM  FACILITATOR(S): Lata Hooker LADC  TOPIC: BEH Group Therapy  Number of patients attending the group:  7  Group Length:  2 Hours    Group Therapy Type: Recovery strategies    Summary of Group / Topics Discussed:    Recovery Principles and Trauma informed care      Group Attendance:  Attended group session    Patient's response to the group topic/interactions:  cooperative with task    Patient appeared to be Engaged.        Client specific details:  Portillo participated in a reflective exercise on building resiliency. He spoke of his plans after discharge and appears ready for the next phase of his recovery journey.

## 2021-04-01 NOTE — PROGRESS NOTES
[facility] Discharge Summary/Instructions     Patient: Portillo Queen  MRN: [id number]   : 1995 Age: 25 year old Sex: male   -  Focus of Treatment / Discharge Recommendations    Personal Safety/ Management of Symptoms    * Follow your safety plan.  Report increased symptoms to your care team and /or go to the nearest Emergency Department.    * Call crisis lines as needed    [facility] [phone]                [facility] [phone]  [facility] [phone]              [facility] [phone]  [facility] [phone]              [facility] [phone]  [facility] [phone]          National Suicide Prevention [phone]  [facility] [phone]            Suicide Prevention [phone]  [facility] [phone]    Abstinence/Relapse Prevention  * Take all medicines as directed.  Carry a current list of medicines with you.  * Use coping skills:   * Do not use illicit (street) drugs, controlled substances (narcotics) or alcohol.    Develop/Improve Independent Living/Socialization Skills: Ensure living environment is conducive to sobriety.    Community Resources/Supports and Discharge Planning:    Follow up with psychiatrist / main caregiver: Kim Guardado    Next visit: Schedule follow-up appointment    Follow up with your therapist: See refferals       Go to group therapy and / or support groups at: [facility]- will contact you to schedule intake appointment.    Client Signature:_______________________   Date / Time:___________      Staff Signature:________________________   Date / Time:___________

## 2021-04-03 NOTE — DISCHARGE SUMMARY
"CHEMICAL DEPENDENCY DISCHARGE SUMMARY   NAME: Portillo Queen  : 1995  MR # [id number]    EVALUATION COUNSELOR: This patient had a Comprehensive Substance Abuse assessment on 3/4/2021 completed by Katey Terrazas [facility].  This patient was seen for a face to face update of the Comprehensive Substance Abuse assessment on 3/5/2021 by Bonnie Torre [facility].     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    LAST USE DATE: 3/01/2021  DAYS OF TREATMENT COMPLETED: 27     PRESENTING INFORMATION: Patient is a 25 year old,  male who was referred for an assessment by self. The reason for seeking services at this time is: \"Looking for a program to help me stay sober and clean\"  The problem(s) began at age 16. Patient has not attempted to resolve these concerns in the past.  Patient is in active withdrawal, but is currently admitted to [facility] for medical detoxification and withdrawal monitoring and is not an imminent safety risk to self or others, and may proceed with the assessment interview    SERVICES PROVIDED:  Services included assessment, orientation, treatment planning, individual counseling, group therapy sessions, family therapy, spiritual care counseling, aftercare planning, acupuncture, nutrition in recovery lecture, workshops focusing on relapse " "prevention and relationships, education on chemical dependency, mental illness, and AIDS/HIV awareness.    ISSUES ADDRESSED IN TREATMENT:      DIMENSION 1/ACUTE WITHDRAWAL ISSUES/DETOX: Admit RR: 1    DC RR: 0  Patient reports his last use date was on 3/01/2021. Patient completed a phenobarbital taper while admitted to unit 3A detox. Patient was able to tolerate mild withdrawal symptoms at the beginning of treatment and participate in programming. No concerns at time of discharge. Risk Rating lowered to \"0.\"    DIMENSION 2/BIOMEDICAL:  Admit RR: 1   DC RR: 1  Patient reported a medical history of high cholesterol and hypertension. Patient signed an JACINTA for his primary provider at [facility] and was encouraged to schedule a post-discharge follow-up appointment. Patient was able to fully participate in all programming.     DIMENSION 3/EMOTIONAL/BEHAVIORAL Admit RR: 2   DC RR:  2  Patient reports a mental health diagnosis of major depression, generalized anxiety, ADHD, and PTSD. Patient completed a suicide re-assessment upon admission, which indicated  very low-risk.   Patient completed a required safety plan with primary counselor, and he denied suicidal ideation or thoughts of self-harm while in treatment. Patient became more outgoing within the milieu as his mood and anxiety symptoms improved. Patient met with staff mental health therapist BUSHRA Sutton for weekly individual therapy sessions.  He applied diaphragmatic breathing and grounding techniques for symptom management. Patient worked to increase emotional intelligence and a healthy sense of self by completing the \"Book of Me\" and \"Step One\" assignments.    Pat appeared to develop understanding of the role his underlying fear of abandonment has on his current unhealthy relationships.   Patient also met with staff therapist each week to process interpersonal issues and mental health challenges. Patient would benefit from continued support managing " "anxiety symptoms and healing childhood trauma. He was given referrals for therapists in the community.  Patient's risk rating remains a  2.       DIMENSION 4/READINESS TO CHANGE:  Admit RR: 2   DC RR: 1  Patient verbalized his primary motivation for treatment was to regain his footing in sobriety after several binge episodes. Patient completed his  Use History and Consequences  assignment and presented in group therapy. Pat identified how grief and loss and perceived abandonment correlate with his alcohol use. Patient worked to increase his internal motivation for sobriety and contemplated on what his life will look like five years sober versus five years of continued alcohol use. Patient's risk rating in this area remains a  1  as he continues to need reinforcement with implementing behavioral changes in order to ensure sobriety.     DIMENSION 5/RELAPSE & CONTINUED PROBLEM POTENTIAL: Admit RR: 4   DC RR: 3  Patient participated in two weekend workshops and gained insight into the emotional, mental, and physical cues that precede relapse. Patient demonstrated the use of coping skills that included progressive muscle relaxation, diaphragmatic breathing, and active participation in 12-step groups. He was open to feedback from group therapy members regarding negative peer influences.Patient participated in weekly spirituality groups facilitated by Janet Del Rosario and supervised by licensed counseling staff. Patient shared how unresolved grief has contributed to his substance abuse. Patient  showed improvement over the course of treatment in regards to challenging his defenses and increasing his self-esteem. Risk rating decreased from  4  to \"3.\"     DIMENSION 6/RECOVERY ENVIRONMENT: Admit RR: 4   DC RR: 3  Patient participated in two weekend workshops on relationship building and gained insight into healthy versus unhealthy relationships. Patient verbalized the need to create connections within the recovery community " and developed supportive relationships with treatment peers.  He identified barriers to sobriety in his recovery environment and participated in a group role play on setting boundaries.  Patient  identified supportive resources in the community that include weekly 12-step meetings, outpatient programming, and individual therapist referrals.  Patient plans to resume employment while attending evening outpatient programming. Patient's risk rating remains a  3  in this dimension based on the completion of all treatment plan goals in this area.     STRENGTHS:  Patient was an active participant, attending all scheduled lectures and groups.Patient provided honest and thoughtful feedback during group therapy and was open to receiving feedback from peers.      PROGNOSIS: The prognosis is favorable as long at patient complies with continuing care recommendations and referrals.     LIVING ARRANGEMENTS AT DISCHARGE:  The patient will return home with his family.      CONTINUING CARE RECOMMENDATIONS AND REFERRALS:   1.  Abstain from all mood-altering chemicals except those prescribed and non-addictive.   2.  Attendance at a minimum of three 12-step meetings per week.   3.  Obtain a permanent male sponsor, maintain regular contact with him, and complete the 12 steps.   4.  Admit to outpatient program at [facility] evening outpatient programming and follow staff recommendations.   5.  Monitor and comply with the advice of doctor regarding mental and physical health issues. Take all medications as prescribed.   6.  Continue to invest in building a sober support network.   7.  Continue to monitor and gain understanding of relapse triggers and stressors through the use and development of healthy coping skills.           This information has been disclosed to you from records protected by Federal confidentiality rules (42 CFR part 2). The Federal rules prohibit you from making any further disclosure of this information unless  further disclosure is expressly permitted by the written consent of the person to whom it pertains or as otherwise permitted by 42 CFR part 2. A general authorization for the release of medical or other information is NOT sufficient for this purpose. The Federal rules restrict any use of the information to criminally investigate or prosecute any alcohol or drug abuse patient.

## 2021-06-01 VITALS — BODY MASS INDEX: 34.11 KG/M2 | HEIGHT: 74 IN | WEIGHT: 265.8 LBS

## 2021-06-26 NOTE — PROGRESS NOTES
Progress Notes by Saloni Taveras MD at 4/26/2018  2:30 PM     Author: Saloni Taveras MD Service: -- Author Type: Physician    Filed: 4/26/2018  8:11 PM Encounter Date: 4/26/2018 Status: Signed    : Saloni Taveras MD (Physician)       Assessment:   Portillo Queen is a 23 y.o. male seen for routine physical with  Annual Exam (Fasting: no ); Establish Care (seen at [facility]); and Mental Health Problem (discuss possible ADD/ADHD - possible referral )      Portillo was seen today for annual exam, establish care and mental health problem.    Encounter for routine history and physical exam for male    Screening for metabolic disorder  -     Lipid Cascade RANDOM  -     Glycosylated Hemoglobin A1c    BMI 34.0-34.9,adult  -     Thyroid Stimulating Hormone (TSH)    Attention deficit  -     Ambulatory referral to Psychology    HTN, goal below 130/80  Comments:  taking lisinorpil for last >5yr . non complient   Orders:  -     Basic Metabolic Panel    Moderate major depression  -     Ambulatory referral to Psychology    SHERIN (generalized anxiety disorder)  -     Ambulatory referral to Psychology    Other orders  -     lisinopril (PRINIVIL,ZESTRIL) 10 MG tablet; Take 1 tablet (10 mg total) by mouth 2 (two) times a day.      Plan to do basic labs and plan to better control the blood pressure before start med to help loose wt. Also referral to psych to [facility] for ADD and depression anxiety.  Plan:      Education reviewed: depression evaluation, low fat, low cholesterol diet, skin cancer screening and weight bearing exercise.    Plan per .medications  and orders     Subjective:       Portillo Queen is a 23 y.o.who presents for an annual exam.  New problem discussed today : help with wt loss , Follow up on blood pressure and increasing depression and anxiety     Little interest or pleasure in doing things: Several days  Feeling down, depressed, or hopeless: More than half the days  Trouble falling or  staying asleep, or sleeping too much: Several days  Feeling tired or having little energy: Not at all  Poor appetite or overeating: More than half the days  Feeling bad about yourself - or that you are a failure or have let yourself or your family down: Nearly every day  Trouble concentrating on things, such as reading the newspaper or watching television: Nearly every day  Moving or speaking so slowly that other people could have noticed. Or the opposite - being so fidgety or restless that you have been moving around a lot more than usual: Nearly every day  Thoughts that you would be better off dead, or of hurting yourself in some way: Not at all  PHQ-9 Total Score: 15  If you checked off any problems, how difficult have these problems made it for you to do your work, take care of things at home, or get along with other people?: Very difficult    SHERIN-7 Screening Results:  How difficult did these problems make it for you to do your work, take care of things at home or get along with other people? : Very difficult  Healthy Habits:   Regular Exercise: Yes  Sunscreen Use: Yes  Healthy Diet: trying most of the time   Dental Visits Regularly: Yes  Seat Belt: Yes  Sexually active: No  Hemoccults: N/A  Colonoscopy: N/A  Lipid Profile: Yes  Glucose Screen: Yes  Prevention of Osteoporosis: N/A  Last Dexa: N/A  Guns at Home:  No         Current Outpatient Prescriptions   Medication Sig Dispense Refill   ? lisinopril (PRINIVIL,ZESTRIL) 10 MG tablet Take 1 tablet (10 mg total) by mouth 2 (two) times a day. 90 tablet 1     No current facility-administered medications for this visit.      No past medical history on file.  No past surgical history on file.  Amoxicillin; Penicillins; and Sulfa (sulfonamide antibiotics)  Family History   Problem Relation Age of Onset   ? Depression Mother    ? Diabetes Mother    ? Depression Father    ? Hypertension Father    ? Diabetes Maternal Grandfather    ? Diabetes Paternal Grandmother   "    Social History     Social History   ? Marital status: Single     Spouse name: N/A   ? Number of children: N/A   ? Years of education: N/A     Occupational History   ? Not on file.     Social History Main Topics   ? Smoking status: Never Smoker   ? Smokeless tobacco: Never Used   ? Alcohol use Yes      Comment: occasional    ? Drug use: Yes     Special: Marijuana      Comment: uses tobacco leaves for paper   ? Sexual activity: Yes     Partners: Female     Other Topics Concern   ? 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        Saloni Taveras MD 4/26/2018 3:35 PM         Review of Systems  General:  Denies problem  Eyes: Denies problem  Ears/Nose/Throat: Denies problem  Cardiovascular: Denies problem  Respiratory:  Denies problem  Gastrointestinal:  Denies problem, Genitourinary: Denies problem  Musculoskeletal:  Denies problem  Skin: Denies problem  Neurologic: Denies problem  Psychiatric: Denies problem  Endocrine: Denies problem  Heme/Lymphatic: Denies problem   Allergic/Immunologic: Denies problem         Objective:         Vitals:    04/26/18 1503 04/26/18 1539   BP: (!) 142/92 138/84   Pulse: 80 80   Weight: (!) 265 lb 12.8 oz (120.6 kg)    Height: 6' 1.62\" (1.87 m)        Physical Exam:  General Appearance: Alert, cooperative, no distress, appears stated age  Head: Normocephalic, without obvious abnormality, atraumatic  Eyes: PERRL, conjunctiva/corneas clear, EOM's intact  Ears: Normal TM's and external ear canals, both ears  Nose: Nares normal, septum midline,mucosa normal, no drainage  Throat: Lips, mucosa, " and tongue normal; teeth and gums normal  Neck: Supple, symmetrical, trachea midline, no adenopathy;  thyroid: not enlarged, symmetric, no tenderness/mass/nodules; no carotid bruit or JVD  Back: Symmetric, no curvature, ROM normal, no CVA tenderness  Lungs: Clear to auscultation bilaterally, respirations unlabored  Heart: Regular rate and rhythm, S1 and S2 normal, no murmur, rub, or gallop, Abdomen: Soft, non-tender, bowel sounds active all four quadrants,  no masses, no organomegaly  Extremities: Extremities normal, atraumatic, no cyanosis or edema  Skin: Skin color, texture, turgor normal, no rashes or lesions  Lymph nodes: Cervical, supraclavicular, and axillary nodes normal  Neurologic: Normal , Mood affect normal     Results for orders placed or performed in visit on 04/26/18   Lipid Cascade RANDOM   Result Value Ref Range    Cholesterol 191 <=199 mg/dL    Triglycerides 126 <=149 mg/dL    HDL Cholesterol 46 >=40 mg/dL    LDL Calculated 120 <=129 mg/dL    Patient Fasting > 8hrs? No    Glycosylated Hemoglobin A1c   Result Value Ref Range    Hemoglobin A1c 4.7 3.5 - 6.1 %   Thyroid Stimulating Hormone (TSH)   Result Value Ref Range    TSH 1.02 0.30 - 5.00 uIU/mL   Basic Metabolic Panel   Result Value Ref Range    Sodium 142 136 - 145 mmol/L    Potassium 4.0 3.5 - 5.0 mmol/L    Chloride 108 (H) 98 - 107 mmol/L    CO2 22 22 - 31 mmol/L    Anion Gap, Calculation 12 5 - 18 mmol/L    Glucose 80 70 - 125 mg/dL    Calcium 10.0 8.5 - 10.5 mg/dL    BUN 14 8 - 22 mg/dL    Creatinine 0.87 0.70 - 1.30 mg/dL    GFR MDRD Af Amer >60 >60 mL/min/1.73m2    GFR MDRD Non Af Amer >60 >60 mL/min/1.73m2            Saloni Taveras     Patient Instructions       Eating Heart-Healthy Food: Using the DASH Plan    Eating for your heart doesnt have to be hard or boring. You just need to know how to make healthier choices. The DASH eating plan has been developed to help you do just that. DASH stands for Dietary Approaches to Stop Hypertension. It  is a plan that has been proven to be healthier for your heart and to lower your risk for high blood pressure. It can also help lower your risk for cancer, heart disease, osteoporosis, and diabetes.  Choosing from each food group  Choose foods from each of the food groups below each day. Try to get the recommended number of servings for each food group. The serving numbers are based on a diet of 2,000 calories a day. Talk to your doctor if youre unsure about your calorie needs. Along with getting the correct servings, the DASH plan also recommends a sodium intake less than 2,300 mg per day.        Grains  Servings: 6 to 8 a day  A serving is:    1 slice bread    1 ounce dry cereal    Half a cup cooked rice, pasta or cereal  Best choices: Whole grains and any grains high in fiber. Vegetables  Servings: 4 to 5 a day  A serving is:    1 cup raw leafy vegetable    Half a cup cut-up raw or cooked vegetable    Half a cup vegetable juice  Best choices: Fresh or frozen vegetables prepared without added salt or fat.   Fruits  Servings: 4 to 5 a day  A serving is:    1 medium fruit    One-quarter cup dried fruit    Half a cup fresh, frozen, or canned fruit    Half a cup of 100% fruit juices  Best choices: A variety of fresh fruits of different colors. Whole fruits are a better choice than fruit juices. Low-fat or fat-free dairy  Servings: 2 to 3 a day  A serving is:    1 cup milk    1 cup yogurt    One and a half ounces cheese  Best choices: Skim or 1% milk, low-fat or fat-free yogurt or buttermilk, and low-fat cheeses.         Lean meats, poultry, fish  Servings: 6 or fewer a day  A serving is:    1 ounce cooked meats, poultry, or fish    1 egg  Best choices: Lean poultry and fish. Trim away visible fat. Broil, grill, roast, or boil instead of frying. Remove skin from poultry before eating. Limit how much red meat you eat.  Nuts, seeds, beans  Servings: 4 to 5 a week  A serving is:    One-third cup nuts (one and a half  ounces)    2 tablespoons nut butter or seeds    Half a cup cooked dry beans or legumes  Best choices: Dry roasted nuts with no salt added, lentils, kidney beans, garbanzo beans, and whole tran beans.   Fats and oils  Servings: 2 to 3 a day  A serving is:    1 teaspoon vegetable oil    1 teaspoon soft margarine    1 tablespoon mayonnaise    2 tablespoons salad dressing  Best choices: Nut and vegetable oils (nontropical vegetable oils), such as olive and canola oil. Sweets  Servings: 5 a week or fewer  A serving is:    1 tablespoon sugar, maple syrup, or honey    1 tablespoon jam or jelly    1 half-ounce jelly beans (about 15)    1 cup lemonade  Best choices: Dried fruit can be a satisfying sweet. Choose low-fat sweets. And watch your serving sizes!      For more on the DASH eating plan, visit:  www.nhlbi.nih.gov/health/health-topics/topics/dash   Date Last Reviewed: 6/1/2016 2000-2016 [employer]. [address] [place] [id number]. All rights reserved. This information is not intended as a substitute for professional medical care. Always follow your healthcare professional's instructions.

## 2022-10-30 NOTE — PROGRESS NOTES
[facility] Weekly Treatment Plan Review      ATTENDANCE for the following date span:  3/08/2021-3/14/2021       Day Monday Tuesday Wednesday Thursday Friday Saturday Sunday   Group Hours   4 4 4 4 4 4 2   Skills Hours    1 1 1   1   Individual Session (L[facility])            Individual Session  (psychotherapy)     1       Peer-led Recovery Group   1 1 1 1 1 1 1       Patient:  Portillo Queen            Adult CD Progress Note and Treatment Plan Review     Attendance  Please refer to OP BEH CD Adult Attendance Record Documentation Flowsheet    Support group attended this week: yes    Reporting sobriety:  yes    Treatment Plan     Treatment Plan Review competed on: 3/15/2021       Client preferred learning style: Visual  Hands on  Verbal  Demonstration    Staff Members contributing JANN Blount; JANN Rosario; JANN Bermudez                         Received Supervision: YES    Client: contributed to goals and plan: Yes    Client received copy of plan/revised plan: Yes    Client agrees with plan/revised plan: Yes    Changes to Treatment Plan: No    New Goals added since last review No additional goals added at this time    Goals worked on since last review: Book of Me assignment, setting goals in recovery, relapse prevention workshop, spirituality group, 1:1 therapy, aftercare planning, coping skills, and developing sober support.     Strategies effective: yes    Strategies need these changes: No changes needed at this time    1) Care Coordination Activities:  Patient was given resources on sober housing  2) Medical, Mental Health and other appointments the client attended: Patient met with staff mental health therapist BUSHRA Sutton on 3/10/2021.  3) Medication issues: See MAR  4) Physical and mental health problems: See Dimension 2 and 3  5) Any changes in Vulnerable Adult Status?  No If yes, add to treatment plan and individual abuse prevention plan.  6) Review and evaluation of the  "individual abuse prevention plan: Current IAPP for this program is adequate for this client    ASAM Risk Rating:  Dimension 1, 1: Patient reports their last date of use as 3/02/201. Patient denies any withdrawal symptoms that would interfere with full participation in treatment programming at this time. Patient will continue to be monitored throughout treatment.   Dimension 2, 1: Patient denies any biomedical concerns that would interfere with full participation in treatment programming at this time. Patient reports that he is currently medication compliant. Patient appears able to access medical aid as needed and will continue to be monitored throughout treatment. Patient attended staff RN lecture on STI's/Pregnancy.  Dimension 3, 2: Patient reports a mental health diagnosis of depression, generalized anxiety, ADHD, and PTSD. Patient met with staff mental health therapist BUSHRA Sutton for an individual therapy session to discuss mental health concerns. Patient reports feeling \" a lot happier\" this week. Patient denies any suicidal thoughts or ideations at this time. Patient will continue to be monitored throughout treatment.   Dimension 4, 1: Patient reports current motivation for treatment is for \"feeling better.\"  Patient is participating in all groups and lectures and completing treatment plan goals and assignments. Patient has been honest about his ambivalence with remaining abstinent from marijuana. Patient appears to be in the contemplation  stage of change at this time.   Dimension 5, 4: Patient rates urges and cravings this week a \"6\" on a scale of 1-10(high). Patient continues to learn and practice coping skills and verbalizes exercising, essential oils, and playing ping pong with peers have been helpful distraction tools.   Dimension 6, 3: Patient is attending sober support meetings and building relationships with peers. Patient's aftercare plan at this time includes attending outpatient " programming, 12-step meetings, and maintaining sober connections. Patient received information and is considering evening outpatient programming at [facility].     Guide to Risk Ratings for Suicidality:   IDEATION: Active thoughts of suicide? INTENT: Intent to follow on suicide? PLAN: Plan to follow through on suicide? Level of Risk:   IF Yes Yes Yes Patient = High Emergent   IF Yes Yes No Patient = High Urgent/Non-Emergent   IF Yes No No Patient = Moderate Non-Urgent   IF No No   No Patient = Low Risk   The patient's ADDITIONAL RISK FACTORS and lack of PROTECTIVE FACTORS may increase their overall suicide risk ratings.     Patient's/client's current risk rating:  Low Risk    Family Involvement:   Facetime with family due to Covid-19 restrictions    Data:   offered feedback client did participate developing insight    Intervention:   Aftercare planning  Behavior modification  Cognitive Behavioral Therapy  Counselor feedback  Education  Emotional management  Group feedback  Motivational Enhancement Therapy  Relapse prevention  Twelve Step facilitation  Mental health education    Assessment:   Stages of Change Model  Contemplation    Appears/Sounds:  Cooperative  Motivated  Engaged  Anxious    Plan:  Focus on recovery environment  Monitor emotional/physical health  Continue working through treatment plan goals.     JANN Blount     abdominal pain

## 2025-02-13 NOTE — PROGRESS NOTES
The patient's goals for the shift include      The clinical goals for the shift include pt will become hemodynamically stable throughout shift.      Problem: Pain - Adult  Goal: Verbalizes/displays adequate comfort level or baseline comfort level  Outcome: Progressing     Problem: Safety - Adult  Goal: Free from fall injury  Outcome: Progressing     Problem: Discharge Planning  Goal: Discharge to home or other facility with appropriate resources  Outcome: Progressing     Problem: Chronic Conditions and Co-morbidities  Goal: Patient's chronic conditions and co-morbidity symptoms are monitored and maintained or improved  Outcome: Progressing     Problem: Nutrition  Goal: Nutrient intake appropriate for maintaining nutritional needs  Outcome: Progressing     Problem: Fall/Injury  Goal: Not fall by end of shift  Outcome: Progressing  Goal: Be free from injury by end of the shift  Outcome: Progressing  Goal: Verbalize understanding of personal risk factors for fall in the hospital  Outcome: Progressing  Goal: Verbalize understanding of risk factor reduction measures to prevent injury from fall in the home  Outcome: Progressing  Goal: Use assistive devices by end of the shift  Outcome: Progressing  Goal: Pace activities to prevent fatigue by end of the shift  Outcome: Progressing        [facility]  [address] [place] [address] [address] and [address] [place] [address]        ADULT CD ASSESSMENT ADDENDUM      Patient Name: Portillo Queen  Cell Phone:   Home: [phone] (home)    Mobile:   Telephone Information:   Mobile [phone]       Email:  [email or web address]  Emergency Contact: Anat Costa- mother    Tel: [phone]    The patient reported being:  Single, in a serious relationship    With which race do you identify?  /     Initial Screening Questions     1. Are you currently having severe withdrawal symptoms that are putting yourself or others in danger?  No    2. Are you currently having severe medical problems that require immediate attention?  No    3. Are you currently having severe emotional or behavioral problems that are putting yourself or others at risk of harm?  No    4. Do you currently participate in community kaushik activities, such as attending Mormonism, temple, Restorationism or Confucianist services?  No    5. How does your spirituality impact your recovery?  It doesn't    6. Do you currently self-administer your medications?  Yes    Do you have a valid 's license?    No, explain: DUI 2020       Mental Health Status   Physical Appearance/Attire: Appears stated age   Hygiene: well groomed   Eye Contact: at examiner   Speech Rate:  regular   Speech Volume: regular   Speech Quality: fluid   Cognitive/Perceptual:  reality based   Cognition: memory intact    Judgment: intact and able to concentrate   Insight: intact and able to concentrate   Orientation:  time, place, person and situation   Thought: logical    Hallucinations:  none   General Behavioral Tone: cooperative   Psychomotor Activity: leg jiggling   Gait:  no problem   Mood: normal   Affect: congruence/appropriate   Counselor Notes: NA     Criteria for Diagnosis: DSM-5 Criteria for Substance Use Disorders      Alcohol Use Disorder Severe - 303.90 (F10.20)  Cannabis Use Disorder  Severe - 304.30 (F12.20)  Sedative, Hypnotic, 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)  Tobacco Use Disorder Mild - 305.10 (Z72.0)  Depression NOS, per patient self-report  Anxiety disorder NOS, per patient self-report    Level of Care   I.) Intoxication and Withdrawal: 1   II.) Biomedical:  1   III.) Emotional and Behavioral:  2   IV.) Readiness to Change:  1   V.) Relapse Potential: 4   VI.) Recovery Environmental: 4     Initial Problem List     The patient lacks relapse prevention skills  The patient has poor coping skills  The patient has poor refusal skills   The patient lacks a sober peer support network  The patient has a tendency to isolate  The patient has dual issues of MI and CD  The patient lacks the ability to effectively manage his/her mental health issues  The patient has current legal issues    Patient/Client is willing to follow treatment recommendations.  Yes    Counselor: JANN Ponce